# Patient Record
Sex: FEMALE | Race: WHITE | Employment: UNEMPLOYED | URBAN - METROPOLITAN AREA
[De-identification: names, ages, dates, MRNs, and addresses within clinical notes are randomized per-mention and may not be internally consistent; named-entity substitution may affect disease eponyms.]

---

## 2017-08-12 ENCOUNTER — ED HISTORICAL/CONVERTED ENCOUNTER (OUTPATIENT)
Dept: OTHER | Age: 27
End: 2017-08-12

## 2018-08-08 ENCOUNTER — OFFICE VISIT (OUTPATIENT)
Dept: ENDOCRINOLOGY | Age: 28
End: 2018-08-08

## 2018-08-08 VITALS
HEIGHT: 61 IN | TEMPERATURE: 97.6 F | WEIGHT: 146.9 LBS | RESPIRATION RATE: 14 BRPM | OXYGEN SATURATION: 99 % | SYSTOLIC BLOOD PRESSURE: 103 MMHG | BODY MASS INDEX: 27.73 KG/M2 | DIASTOLIC BLOOD PRESSURE: 85 MMHG | HEART RATE: 74 BPM

## 2018-08-08 DIAGNOSIS — E04.9 GOITER: Primary | ICD-10-CM

## 2018-08-08 DIAGNOSIS — E04.9 NODULAR GOITER: ICD-10-CM

## 2018-08-08 RX ORDER — NORETHINDRONE ACETATE AND ETHINYL ESTRADIOL AND FERROUS FUMARATE 1MG-20(24)
KIT ORAL
COMMUNITY
End: 2022-10-29

## 2018-08-08 NOTE — MR AVS SNAPSHOT
[address] [address] 
[phone] Patient: Josy Lane MRN: [id number] Visit Information Date & Time Provider Department Dept. Phone Encounter #  
 8/8/2018 10:00 AM Jackson Fish MD [facility] Diabetes & Endocrinology [phone] [id number] Follow-up Instructions Return if symptoms worsen or fail to improve. Upcoming Health Maintenance Date Due DTaP/Tdap/Td series (1 - Tdap) 7/17/2009 PAP AKA CERVICAL CYTOLOGY 7/17/2009 Influenza Age 5 to Adult 8/1/2018 Allergies as of 8/8/2018  Review Complete On: 8/8/2018 By: Jackson Fish MD  
  
 Severity Noted Reaction Type Reactions Peanut High 08/08/2018    Anaphylaxis Codeine  08/08/2018    Rash Penicillins  08/08/2018    Rash Current Immunizations  Never Reviewed No immunizations on file. Not reviewed this visit You Were Diagnosed With   
  
 Codes Comments Goiter    -  Primary ICD-10-CM: E04.9 ICD-9-CM: 240.9 Vitals BP Pulse Temp Resp Height(growth percentile) Weight(growth percentile) 103/85 (BP 1 Location: Right arm, BP Patient Position: Sitting) 74 97.6 °F (36.4 °C) (Oral) 14 5' 1\" (1.549 m) 146 lb 14.4 oz (66.6 kg) SpO2 BMI OB Status Smoking Status 99% 27.76 kg/m2 Having regular periods Never Smoker BMI and BSA Data Body Mass Index Body Surface Area  
 27.76 kg/m 2 1.69 m 2 Preferred Pharmacy Pharmacy Name Phone [facility] [address], [address] [facility] Cyndi Has AT [address] [phone] Your Updated Medication List  
  
   
This list is accurate as of 8/8/18 10:45 AM.  Always use your most recent med list.  
  
  
  
  
 MINASTRIN 24 FE 1 mg-20 mcg(24) /75 mg (4) Chew Generic drug:  norethindrone-e.estradiol-iron Minastrin 24 Fe 1 mg-20 mcg (24)/75 mg (4) chewable tablet We Performed the Following   
 T3 TOTAL [93482 CPT(R)] T4, FREE X081249 CPT(R)] THYROID ANTIBODY PANEL [43226 CPT(R)] TSH 3RD GENERATION [98178 CPT(R)] Follow-up Instructions Return if symptoms worsen or fail to improve. Introducing [facility] SERVICES! Gely Gr introduces [employer] patient portal. Now you can access parts of your medical record, email your doctor's office, and request medication refills online. 1. In your internet browser, go to [email or web address] 2. Click on the First Time User? Click Here link in the Sign In box. You will see the New Member Sign Up page. 3. Enter your [employer] Access Code exactly as it appears below. You will not need to use this code after youve completed the sign-up process. If you do not sign up before the expiration date, you must request a new code. · [employer] Access Code: [email or web address] Expires: 11/6/2018 10:45 AM 
 
4. Enter the last four digits of your Social Security Number (xxxx) and Date of Birth (mm/dd/yyyy) as indicated and click Submit. You will be taken to the next sign-up page. 5. Create a [employer] ID. This will be your [employer] login ID and cannot be changed, so think of one that is secure and easy to remember. 6. Create a [employer] password. You can change your password at any time. 7. Enter your Password Reset Question and Answer. This can be used at a later time if you forget your password. 8. Enter your e-mail address. You will receive e-mail notification when new information is available in [address] 9. Click Sign Up. You can now view and download portions of your medical record. 10. Click the Download Summary menu link to download a portable copy of your medical information. If you have questions, please visit the Frequently Asked Questions section of the [employer] website. Remember, [employer] is NOT to be used for urgent needs. For medical emergencies, dial 911. Now available from your iPhone and Android! Please provide this summary of care documentation to your next provider. Your primary care clinician is listed as Frank Carter. If you have any questions after today's visit, please call [phone].

## 2018-08-08 NOTE — PROGRESS NOTES
[facility]               Vy Bradford MD        [address] 78 [phone] Fax [phone]           Patient Information  Date:2018  Name : Gely Bazzi 25 y.o.     YOB: 1990         Referred by: Sebastian Guzman MD         Chief Complaint   Patient presents with   [facility] New Patient     referred by Dr. Marcus Cade for Thyroid       History of present illness    Gely Bazzi is a 25 y.o. female  here for evaluation of thyroid. She was found to have goiter 2 years ago, thyroid ultrasound showed a small nodule. She is 2 months postpartum, complains of fatigue, constipation, nervousness, shakiness. Also noticed some increase in the size of the neck, hoarseness. No persistent dysphagia  No recent thyroid function test.  No fever or sore throat. She is not lactating now      No FH of thyroid cancer     Wt Readings from Last 3 Encounters:   18 146 lb 14.4 oz (66.6 kg)       History reviewed. No pertinent past medical history. Current Outpatient Prescriptions   Medication Sig    norethindrone-e.estradiol-iron (MINASTRIN 24 FE) 1 mg-20 mcg(24) /75 mg (4) chew Minastrin 24 Fe 1 mg-20 mcg (24)/75 mg (4) chewable tablet     No current facility-administered medications for this visit. Review of Systems:  - Constitutional Symptoms: no fevers, chills, weight loss  - Eyes: no blurry vision or double vision  - Cardiovascular: no chest pain + palpitations  - Respiratory: no cough or shortness of breath  - Gastrointestinal: no dysphagia or abdominal pain  - Musculoskeletal: no joint pains + weakness  - Integumentary: no rashes  - Neurological: no numbness, tingling, + headaches  - Psychiatric: no depression or anxiety  - Endocrine: no heat or cold intolerance, no polyuria or polydipsia    Physical Examination:  Blood pressure 103/85, pulse 74, temperature 97.6 °F (36.4 °C), temperature source Oral, resp.  rate 14, height 5' 1\" (1.549 m), weight 146 lb 14.4 oz (66.6 kg), SpO2 99 %. Body mass index is 27.76 kg/(m^2). - General: pleasant, no distress, good eye contact  - HEENT: no exopthalmos, no periorbital edema, no scleral/conjunctival injection, EOMI, no lid lag or stare  - Neck: supple, no thyromegaly, no nodules, lymph nodes,   - Cardiovascular: regular,  normal S1 and S2, no murmurs  - Respiratory: clear to auscultation bilaterally  - Gastrointestinal: soft, nontender, nondistended, BS +  - Musculoskeletal: no proximal muscle weakness in upper or lower extremities  - Integumentary: no  tremors, no edema  - Neurological: alert and oriented   - Psychiatric: normal mood and affect  - Skin - normal turgor    Data Reviewed:         [] Reviewed lab  s    Assessment/Plan:     1. Goiter    2. Nodular goiter    3. Postpartum thyroiditis        Nodular goiter: 2 years ago had ultrasound. Ultrasound today showed 1.4 cm left thyroid nodule with no high risk characteristics. No microcalcifications. Obtain thyroid function test  She has both hypo-as well as hyperthyroid symptoms, discussed about postpartum changes in the thyroid function, overlap of the symptoms. Discussed the natural course of thyroid nodules. Follow-up ultrasound in a year discussed        Follow-up Disposition:  Return if symptoms worsen or fail to improve. Thank you for allowing me to participate in the care of this patient. Carlo Paredes MD      Patient Veda Weaver verbalized understanding  Voice-recognition software was used to generate this report, which may result in some phonetic-based errors in the grammar and contents. Even though attempts were made to correct all the mistakes, some may have been missed and remained in the body of the report.

## 2018-08-08 NOTE — PROGRESS NOTES
Nicci Sharma is a 25 y.o. female here for   Chief Complaint   Patient presents with    New Patient     referred by Dr. Imelda Stewart for Thyroid       1. Have you been to the ER, urgent care clinic since your last visit? Hospitalized since your last visit? -n/a    2. Have you seen or consulted any other health care providers outside of the [address] [place] [address] since your last visit?   Include any pap smears or colon screening.-n/a

## 2018-08-09 LAB
T3 SERPL-MCNC: 121 NG/DL (ref 71–180)
T4 FREE SERPL-MCNC: 1.22 NG/DL (ref 0.82–1.77)
THYROGLOB AB SERPL-ACNC: <1 IU/ML (ref 0–0.9)
THYROPEROXIDASE AB SERPL-ACNC: 8 IU/ML (ref 0–34)
TSH SERPL DL<=0.005 MIU/L-ACNC: 1.2 UIU/ML (ref 0.45–4.5)

## 2018-08-09 NOTE — PROGRESS NOTES
Thyroid Ultrasound Report    Patient Information  Date:8/8/2018  Name : Paddy Simmons 25 y.o.     YOB: 1990         Referred by: Kirby Trejo MD , MD    Indication: Thyroid nodule/    Multiple real time longitudinal and transverse images were obtained using a high  resolution ultrasound with a Linear transducer. Right thyroid lobe measures 3.8 x 1.5 x 0.8 cm. Left thyroid lobe measures 4.3 x 1.5 x 1.2 cm. There is a isoechoic nodule in the left lobe measuring 1.4 x 0.7 x 0.6 cm with peripheral vascularization. No microcalcifications. The isthmus measures 0.25 cm. Impression. Left thyroid nodule measuring 1.4 cm with no high risk characteristics. Follow-up ultrasound recommended in a year.      Carolann Mc MD

## 2018-08-11 PROBLEM — E04.9 NODULAR GOITER: Status: ACTIVE | Noted: 2018-08-11

## 2018-08-11 NOTE — PROGRESS NOTES
Thyroid tests are  normal, post pregnancy thyroid levels fluctuate, most of the symptoms will improve with time.   There is no improvement, have her come back for the repeat thyroid function test.  No need for appointment

## 2018-08-13 ENCOUNTER — TELEPHONE (OUTPATIENT)
Dept: ENDOCRINOLOGY | Age: 28
End: 2018-08-13

## 2018-08-13 NOTE — TELEPHONE ENCOUNTER
Per Dr. Jesús Mendoza, informed pt of result note, as noted above. Pt verbalized understanding with no further questions or concerns at this time.

## 2018-08-13 NOTE — TELEPHONE ENCOUNTER
----- Message from Pearline Severs, MD sent at 8/11/2018  7:16 PM EDT -----  Thyroid tests are  normal, post pregnancy thyroid levels fluctuate, most of the symptoms will improve with time.   There is no improvement, have her come back for the repeat thyroid function test.  No need for appointment

## 2018-11-16 ENCOUNTER — ED HISTORICAL/CONVERTED ENCOUNTER (OUTPATIENT)
Dept: OTHER | Age: 28
End: 2018-11-16

## 2019-01-30 ENCOUNTER — PATIENT MESSAGE (OUTPATIENT)
Dept: ENDOCRINOLOGY | Age: 29
End: 2019-01-30

## 2019-11-01 ENCOUNTER — OFFICE VISIT (OUTPATIENT)
Dept: ENDOCRINOLOGY | Age: 29
End: 2019-11-01

## 2019-11-01 VITALS
HEIGHT: 61 IN | RESPIRATION RATE: 14 BRPM | HEART RATE: 72 BPM | OXYGEN SATURATION: 99 % | TEMPERATURE: 97.9 F | DIASTOLIC BLOOD PRESSURE: 69 MMHG | WEIGHT: 137.4 LBS | BODY MASS INDEX: 25.94 KG/M2 | SYSTOLIC BLOOD PRESSURE: 99 MMHG

## 2019-11-01 DIAGNOSIS — E06.9 THYROIDITIS: ICD-10-CM

## 2019-11-01 DIAGNOSIS — E04.9 NODULAR GOITER: Primary | ICD-10-CM

## 2019-11-01 RX ORDER — BISMUTH SUBSALICYLATE 262 MG
1 TABLET,CHEWABLE ORAL DAILY
COMMUNITY
End: 2021-01-28

## 2019-11-01 NOTE — LETTER
11/5/19 Patient: Dionna Elizabeth YOB: 1990 Date of Visit: 11/1/2019 Leslie Smith MD 
[address] [place] [address] VIA Facsimile: [phone] Dear Leslie Smith MD, Thank you for referring Ms. Joy Pineda to [address] [place] [address] for evaluation. My notes for this consultation are attached. If you have questions, please do not hesitate to call me. I look forward to following your patient along with you. Sincerely, Daryl Gonzalez MD

## 2019-11-01 NOTE — PROGRESS NOTES
Petey North MD        [address] 78 [phone] Fax [phone]           Patient Information  Date:11/3/2019  Name : Francia Mays 30 y.o.     YOB: 1990         Referred by: Morena Church MD         Chief Complaint   Patient presents with    Thyroid Problem       History of present illness    Francia Mays is a 30 y.o. female  here for follow-up of thyroid. Nodular goiter, reports pain in the neck, difficulty swallowing,  No GERD, no postnasal drainage  No recent infection  No persistent dysphagia  No fever        No FH of thyroid cancer     Wt Readings from Last 3 Encounters:   11/01/19 137 lb 6.4 oz (62.3 kg)   08/08/18 146 lb 14.4 oz (66.6 kg)       History reviewed. No pertinent past medical history. Current Outpatient Medications   Medication Sig    multivitamin (ONE A DAY) tablet Take 1 Tab by mouth daily.  norethindrone-e.estradiol-iron (MINASTRIN 24 FE) 1 mg-20 mcg(24) /75 mg (4) chew Minastrin 24 Fe 1 mg-20 mcg (24)/75 mg (4) chewable tablet     No current facility-administered medications for this visit. Review of Systems:  - Constitutional Symptoms: no fevers, chills, weight loss  - Eyes: no blurry vision or double vision  - Cardiovascular: no chest pain + palpitations  - Respiratory: no cough or shortness of breath  - Gastrointestinal: no dysphagia or abdominal pain  - Musculoskeletal: no joint pains + weakness  - Integumentary: no rashes  - Neurological: no numbness, tingling, + headaches  - Psychiatric: no depression or anxiety  - Endocrine: no heat or cold intolerance, no polyuria or polydipsia    Physical Examination:  Blood pressure 99/69, pulse 72, temperature 97.9 °F (36.6 °C), temperature source Oral, resp. rate 14, height 5' 1\" (1.549 m), weight 137 lb 6.4 oz (62.3 kg), SpO2 99 %. Body mass index is 25.96 kg/m².   - General: pleasant, no distress, good eye contact  - HEENT: no exopthalmos, no periorbital edema, no scleral/conjunctival injection, EOMI, no lid lag or stare  - Neck: supple, tenderness of the anterior neck area, neck muscles, as well as thyroid, no lymphadenopathy  - Cardiovascular: regular,  normal S1 and S2, no murmurs  - Respiratory: clear to auscultation bilaterally  - Gastrointestinal: soft, nontender, nondistended, BS +  - Musculoskeletal: no proximal muscle weakness in upper or lower extremities  - Integumentary: no  tremors, no edema  - Neurological: alert and oriented   - Psychiatric: normal mood and affect  - Skin - normal turgor    Data Reviewed:         [] Reviewed lab  s    Assessment/Plan:     1. Nodular goiter    2. Thyroiditis        Nodular goiter:   Ultrasound showed 1.4 cm left thyroid nodule with no high risk characteristics. No microcalcifications. The size is small to cause dysphagia  She has tenderness in the entire anterior cervical area not necessarily just overlying thyroid area, thyroiditis versus GERD with esophagitis  Acid reducer, prednisone for 5 days  Ultrasound thyroid November 2019 1.3 x 0.7 x 0.6 cm left thyroid nodule, no change, no increased vascularity    If she has persistent symptoms refer to ENT versus GI for further evaluation dysphagia/odynophagia      Follow-up and Dispositions    · Return in about 1 year (around 11/1/2020). Thank you for allowing me to participate in the care of this patient. Josefina Godwin MD      Patient [facility] verbalized understanding  Voice-recognition software was used to generate this report, which may result in some phonetic-based errors in the grammar and contents. Even though attempts were made to correct all the mistakes, some may have been missed and remained in the body of the report.

## 2019-11-01 NOTE — PROGRESS NOTES
Elsy Angela is a 30 y.o. female here for   Chief Complaint   Patient presents with    Thyroid Problem       1. Have you been to the ER, urgent care clinic since your last visit? Hospitalized since your last visit? -no    2. Have you seen or consulted any other health care providers outside of the [address] [place] [address] since your last visit?   Include any pap smears or colon screening.-not recently  Multivitamin

## 2019-11-02 LAB
ERYTHROCYTE [SEDIMENTATION RATE] IN BLOOD BY WESTERGREN METHOD: 3 MM/HR (ref 0–32)
T4 FREE SERPL-MCNC: 1.27 NG/DL (ref 0.82–1.77)
TSH SERPL DL<=0.005 MIU/L-ACNC: 0.78 UIU/ML (ref 0.45–4.5)

## 2019-11-03 RX ORDER — PREDNISONE 20 MG/1
20 TABLET ORAL
Qty: 7 TAB | Refills: 0 | Status: SHIPPED | OUTPATIENT
Start: 2019-11-03 | End: 2020-07-08

## 2019-11-05 NOTE — PROGRESS NOTES
Thyroid Ultrasound Report    Patient Information  Date:11/5/2019  Name : Fouzia Ren 30 y.o.     YOB: 1990         Referred by: Sanjay Toledo MD , MD    Indication: Thyroid nodule/    Multiple real time longitudinal and transverse images were obtained using a high  resolution ultrasound with a Linear transducer. Right thyroid lobe measures 3.6 x 1.6 x 1.1 cm. Left thyroid lobe measures 3.8 x 1.5 x 1.3 cm. Isthmus measures 0.18 cm  There is a isoechoic nodule in the left lobe measuring 1.3 x 0.7 x 0.6 cm with peripheral vascularization. No microcalcifications. This is another subcentimeter nodule measuring 0.7 cm in the left lobe. Impression. Left thyroid nodule measuring 1. 3 cm which is stable in size compared to prior ultrasounds.      Martha Hatch MD

## 2019-11-14 ENCOUNTER — ED HISTORICAL/CONVERTED ENCOUNTER (OUTPATIENT)
Dept: OTHER | Age: 29
End: 2019-11-14

## 2020-07-08 ENCOUNTER — OFFICE VISIT (OUTPATIENT)
Dept: ENDOCRINOLOGY | Age: 30
End: 2020-07-08

## 2020-07-08 VITALS
OXYGEN SATURATION: 100 % | RESPIRATION RATE: 18 BRPM | HEIGHT: 61 IN | HEART RATE: 80 BPM | SYSTOLIC BLOOD PRESSURE: 103 MMHG | DIASTOLIC BLOOD PRESSURE: 69 MMHG | BODY MASS INDEX: 27.55 KG/M2 | TEMPERATURE: 98.8 F | WEIGHT: 145.9 LBS

## 2020-07-08 DIAGNOSIS — E04.9 NODULAR GOITER: Primary | ICD-10-CM

## 2020-07-08 DIAGNOSIS — M25.50 ARTHRALGIA, UNSPECIFIED JOINT: ICD-10-CM

## 2020-07-08 DIAGNOSIS — E04.9 NODULAR GOITER: ICD-10-CM

## 2020-07-08 DIAGNOSIS — E55.9 VITAMIN D DEFICIENCY: ICD-10-CM

## 2020-07-08 DIAGNOSIS — E06.9 THYROIDITIS: ICD-10-CM

## 2020-07-08 NOTE — PATIENT INSTRUCTIONS
Gluten-Free Diet: Care Instructions Your Care Instructions To help your symptoms, your doctor has recommended a gluten-free diet. This means not eating foods that have gluten in them. Gluten is a kind of protein. It's found in wheat, barley, and rye. If you eat a gluten-free diet, you can help manage your symptoms and prevent long-term problems. You can also get all the nutrition you need. Follow-up care is a key part of your treatment and safety. Be sure to make and go to all appointments, and call your doctor if you are having problems. It's also a good idea to know your test results and keep a list of the medicines you take. How can you care for yourself at home? · Don't eat any foods that have gluten in them. These include bagels, bread, crackers, and some cereals. They also include pasta and pizza. · Carefully read food labels. Look for wheat or wheat products in ice cream and candy. You may also find them in salad dressing, canned and frozen soups and vegetables, and other processed foods. · Avoid all beer products unless the label says they are gluten-free. Beers with and without alcohol have gluten unless the labels say they are gluten-free. This includes lagers, ales, and stouts. · Avoid oats, at least at first. Oats may cause symptoms in some people, perhaps as a result of contamination with wheat, barley, or rye during processing. But many people who have celiac disease can eat moderate amounts of oats without having symptoms. Health professionals vary in their long-term recommendations regarding eating foods with oats. But most agree it is safe to eat oats labeled as gluten-free. · When you eat out, look for restaurants that serve gluten-free food. You can also ask if the  is familiar with gluten-free cooking. · Try to learn more about gluten-free options. Find grocery stores that sell gluten-free pizza and other foods.  If you have access to the Internet, look online for gluten-free foods and recipes. · On a gluten-free eating plan, it's okay to have: 
? Eggs and dairy products. (But some dairy products may make your symptoms worse. Ask your doctor if you have questions about dairy products. Read ingredient labels carefully. Some processed cheeses contain gluten.) ? Flours and foods made with amaranth, arrowroot, beans, buckwheat, corn, cornmeal, flax, millet, potatoes, gluten-free nut and oat bran, quinoa, rice, sorghum, soybeans, tapioca, or teff. ? Fresh, frozen, or canned unprocessed meats. But avoid processed meats. Some examples of processed meats to avoid are hot dogs, salami, and deli meat. Read labels for additives that may contain gluten. ? Fresh, frozen, dried, or canned fruits and vegetables, if they do not have thickeners or other additives that contain gluten. ? Some alcohol drinks. These include wine, liqueurs, and ciders. They also include liquor like whiskey and nolvia. When should you call for help? Watch closely for changes in your health, and be sure to contact your doctor if: 
· You have unexplained weight loss. · You have diarrhea that lasts longer than 1 to 2 weeks. · You have unusual fatigue or mood changes, especially if these last more than a week and are not related to any other illness, such as the flu. · Your symptoms come back again. · Your stomach pain gets worse. Where can you learn more? Go to http://www.gray.com/ Enter 31 41 19 in the search box to learn more about \"Gluten-Free Diet: Care Instructions. \" Current as of: August 22, 2019               Content Version: 12.5 © 6245-8797 [employer]. Care instructions adapted under license by [employer] (which disclaims liability or warranty for this information).  If you have questions about a medical condition or this instruction, always ask your healthcare professional. [address] any warranty or liability for your use of this information.

## 2020-07-08 NOTE — LETTER
7/8/20 Patient: Kim Izaguirre YOB: 1990 Date of Visit: 7/8/2020 Tito Selby MD 
[address] [place] [address] VIA Facsimile: [phone] Dear Tito Selby MD, Thank you for referring Ms. Joy Pineda to [address] [place] [address] for evaluation. My notes for this consultation are attached. If you have questions, please do not hesitate to call me. I look forward to following your patient along with you. Sincerely, Pinky Andres MD

## 2020-07-08 NOTE — PROGRESS NOTES
1. Have you been to the ER, urgent care clinic since your last visit? No  Hospitalized since your last visit? No    2. Have you seen or consulted any other health care providers outside of the [address] [place] [address] since your last visit? Include any pap smears or colon screening.  No

## 2020-07-08 NOTE — PROGRESS NOTES
Petey North MD              Patient Information  Date:7/8/2020  Name : Francia Mays 30 y.o.     YOB: 1990         Referred by: Morena Church MD         Chief Complaint   Patient presents with    Thyroid Problem       History of present illness    Francia Mays is a 30 y.o. female  here for follow-up of thyroid. Nodular goiter, reports pain in the neck, difficulty swallowing, \"feels like a lump in the throat\" which started 2 months ago  She had similar symptoms 6 to 7 months ago, improved with prednisone, Nexium to some extent  Symptoms did not completely fully resolved. Last 2 months noted worsening  No GERD, no postnasal drainage  No recent infection  No fever    Has strong family history of autoimmune disease  Mother has? Lupus, daughter has PANDAS      No FH of thyroid cancer     Wt Readings from Last 3 Encounters:   07/08/20 145 lb 14.4 oz (66.2 kg)   11/01/19 137 lb 6.4 oz (62.3 kg)   08/08/18 146 lb 14.4 oz (66.6 kg)       History reviewed. No pertinent past medical history. Current Outpatient Medications   Medication Sig    multivitamin (ONE A DAY) tablet Take 1 Tab by mouth daily.  norethindrone-e.estradiol-iron (MINASTRIN 24 FE) 1 mg-20 mcg(24) /75 mg (4) chew Minastrin 24 Fe 1 mg-20 mcg (24)/75 mg (4) chewable tablet     No current facility-administered medications for this visit. Review of Systems:  -   - Gastrointestinal: no dysphagia or abdominal pain  - Musculoskeletal: no joint pains + weakness  - Integumentary: no rashes  - Neurological: no numbness, tingling, + headaches  - Psychiatric: no depression or anxiety  - Endocrine: no heat or cold intolerance, no polyuria or polydipsia    Physical Examination:  Blood pressure 103/69, pulse 80, temperature 98.8 °F (37.1 °C), temperature source Oral, resp. rate 18, height 5' 1\" (1.549 m), weight 145 lb 14.4 oz (66.2 kg), SpO2 100 %.     Body mass index is 27.57 kg/m².  - General: pleasant, no distress, good eye contact  - HEENT: no exopthalmos, no periorbital edema, no scleral/conjunctival injection, EOMI, no lid lag or stare  - Neck: Tenderness of the anterior neck area, neck muscles, cricoid area as well as thyroid, no lymphadenopathy  - Cardiovascular: regular,  normal S1 and S2,  - Respiratory: clear to auscultation bilaterally  - Gastrointestinal: soft, nontender, nondistended, BS +  - Musculoskeletal: no proximal muscle weakness in upper or lower extremities  - Integumentary: no  tremors, no edema  - Neurological: alert and oriented   - Psychiatric: normal mood and affect  - Skin - normal turgor    Data Reviewed:         [] Reviewed lab  s    Assessment/Plan:     1. Nodular goiter    2. Thyroiditis    3. Arthralgia, unspecified joint    4. Vitamin D deficiency        Nodular goiter:   Ultrasound showed 1.4 cm left thyroid nodule with no high risk characteristics. No microcalcifications in 2019. July 2020 1.5 cm left thyroid nodule, irregular borders, no increased vascularity  The size is small to cause dysphagia  She has tenderness in the entire anterior cervical area not necessarily just overlying thyroid area, thyroiditis versus GERD with esophagitis  NSAIDs for 7 days, Prilosec for 2 weeks  She had biopsy of the nodule several years ago, states the nodule was less than 1 cm, I do not have the report  In 2019, TPO normal, ESR normal, thyroid function tests were normal    Check thyroglobulin, TSH, free T4.,  ESR    If no improvement refer to ENT versus GI for further evaluation dysphagia/odynophagia    Left thyroid nodule: Meets the criteria for FNA, will wait for thyroiditis to resolve      Follow-up and Dispositions    · Return in about 2 months (around 9/8/2020). Thank you for allowing me to participate in the care of this patient.     Mike Harris MD      Patient Madelyn Combs verbalized understanding  Voice-recognition software was used to generate this report, which may result in some phonetic-based errors in the grammar and contents. Even though attempts were made to correct all the mistakes, some may have been missed and remained in the body of the report.

## 2020-07-09 ENCOUNTER — TELEPHONE (OUTPATIENT)
Dept: ENDOCRINOLOGY | Age: 30
End: 2020-07-09

## 2020-07-09 LAB
ANA SER QL: NEGATIVE
ERYTHROCYTE [SEDIMENTATION RATE] IN BLOOD BY WESTERGREN METHOD: 3 MM/HR (ref 0–32)
T4 FREE SERPL-MCNC: 1.19 NG/DL (ref 0.82–1.77)
THYROGLOB AB SERPL-ACNC: <1 IU/ML (ref 0–0.9)
THYROPEROXIDASE AB SERPL-ACNC: 7 IU/ML (ref 0–34)
TSH SERPL DL<=0.005 MIU/L-ACNC: 1.07 UIU/ML (ref 0.45–4.5)

## 2020-07-09 NOTE — TELEPHONE ENCOUNTER
Per Dr. Cher Zavala, informed pt of result note, as noted above. Pt verbalized understanding and stated someone told her she would be contacted with the appt for ENT. Informed her the  is working on it. No further questions or concerns at this time. [facility]

## 2020-07-09 NOTE — PROGRESS NOTES
Thyroid is working normal.  According to the test there is no evidence of autoimmune condition. Continue the treatment as discussed, if no improvement need to see the ENT.       I ordered thyroglobulin, lab did thyroglobulin antibody, please call lab and see if they can add thyroglobulin

## 2020-07-09 NOTE — PROGRESS NOTES
Thyroid Ultrasound Report    Patient Information  Date:7/8/2020  Name : Garry Brewer y.o.     YOB: 1990         Referred by: Felciitas Dickerson MD , MD    Indication: Thyroid nodule/    Multiple real time longitudinal and transverse images were obtained using a high  resolution ultrasound with a Linear transducer. Right thyroid lobe measures 3.6 x 1.6 x 1.1 cm. Left thyroid lobe measures 3.8 x 1.5 x 1.3 cm. Isthmus measures 0.18 cm  There is a isoechoic nodule in the left lobe measuring 1. 5 x 0.7 x 0.6 cm with peripheral vascularization. No microcalcifications. Borders are irregular. Impression. Left thyroid nodule measuring 1.  5 cm which is stable in size compared to prior ultrasounds.   Meets the criteria for fine-needle aspiration biopsy     Tito Fung MD

## 2020-07-09 NOTE — TELEPHONE ENCOUNTER
----- Message from Aida Cm MD sent at 7/9/2020  1:39 PM EDT -----  Thyroid is working normal.  According to the test there is no evidence of autoimmune condition. Continue the treatment as discussed, if no improvement need to see the ENT.       I ordered thyroglobulin, lab did thyroglobulin antibody, please call lab and see if they can add thyroglobulin

## 2020-07-13 LAB
SPECIMEN STATUS REPORT, ROLRST: NORMAL
THYROGLOB AB SERPL-ACNC: <1 IU/ML
THYROGLOB SERPL-MCNC: 9 NG/ML
THYROGLOB SERPL-MCNC: NORMAL NG/ML

## 2020-07-24 RX ORDER — PREDNISONE 20 MG/1
20 TABLET ORAL
Qty: 7 TAB | Refills: 0 | Status: SHIPPED | OUTPATIENT
Start: 2020-07-24 | End: 2021-01-28

## 2020-08-06 ENCOUNTER — OFFICE VISIT (OUTPATIENT)
Dept: ENT CLINIC | Age: 30
End: 2020-08-06
Payer: COMMERCIAL

## 2020-08-06 VITALS
SYSTOLIC BLOOD PRESSURE: 120 MMHG | WEIGHT: 147 LBS | HEIGHT: 62 IN | OXYGEN SATURATION: 99 % | DIASTOLIC BLOOD PRESSURE: 70 MMHG | RESPIRATION RATE: 18 BRPM | HEART RATE: 79 BPM | TEMPERATURE: 97.5 F | BODY MASS INDEX: 27.05 KG/M2

## 2020-08-06 DIAGNOSIS — J39.2 PHARYNGEAL OR NASOPHARYNGEAL CYST: ICD-10-CM

## 2020-08-06 DIAGNOSIS — E04.1 THYROID NODULE: ICD-10-CM

## 2020-08-06 DIAGNOSIS — R13.12 OROPHARYNGEAL DYSPHAGIA: ICD-10-CM

## 2020-08-06 DIAGNOSIS — J02.9 ACUTE PHARYNGITIS, UNSPECIFIED ETIOLOGY: Primary | ICD-10-CM

## 2020-08-06 PROCEDURE — 31575 DIAGNOSTIC LARYNGOSCOPY: CPT | Performed by: OTOLARYNGOLOGY

## 2020-08-06 PROCEDURE — 99204 OFFICE O/P NEW MOD 45 MIN: CPT | Performed by: OTOLARYNGOLOGY

## 2020-08-06 RX ORDER — LEVOFLOXACIN 500 MG/1
500 TABLET, FILM COATED ORAL DAILY
Qty: 14 TAB | Refills: 1 | Status: SHIPPED | OUTPATIENT
Start: 2020-08-06 | End: 2021-01-28

## 2020-08-06 NOTE — PROGRESS NOTES
Subjective:    Eleanor Hernandez   26 y.o.   1990     HPI     Location - throat    Quality - throat pain, dysphagia, globus    Severity -  Mild,moderate    Duration - approx 7-8 mos    Timing - constant    Context - pt with known left thyroid nodule, had FNA approx 3 years ago, she does f/u with  for this; her symptoms are more sore throat with globus and dysphagia, she had had T&A as a child; no recent FNA although one may be planned; she tried steroids and PPI    Modifying Features - steroids did help a little, reduced swelling so she could swallow again    Associated symptoms/signs - as above      Review of Systems  Review of Systems   Constitutional: Negative for chills and fever. HENT: Positive for sore throat. Negative for ear pain, hearing loss, nosebleeds and tinnitus. Eyes: Negative for blurred vision and double vision. Respiratory: Negative for cough, sputum production and shortness of breath. Cardiovascular: Negative for chest pain and palpitations. Gastrointestinal: Negative for heartburn, nausea and vomiting. Musculoskeletal: Negative for joint pain and neck pain. Skin: Negative. Neurological: Positive for speech change. Negative for dizziness, weakness and headaches. Endo/Heme/Allergies: Negative for environmental allergies. Does not bruise/bleed easily. Psychiatric/Behavioral: Negative for memory loss. The patient does not have insomnia. History reviewed. No pertinent past medical history.   Past Surgical History:   Procedure Laterality Date    HX APPENDECTOMY      HX CHOLECYSTECTOMY      HX TONSIL AND ADENOIDECTOMY        Family History   Problem Relation Age of Onset    Hypertension Father     Thyroid Disease Father     Thyroid Disease Other     Hypertension Other      Social History     Tobacco Use    Smoking status: Never Smoker    Smokeless tobacco: Never Used   Substance Use Topics    Alcohol use: No      Prior to Admission medications Medication Sig Start Date End Date Taking? Authorizing Provider   levoFLOXacin (LEVAQUIN) 500 mg tablet Take 1 Tab by mouth daily. 8/6/20  Yes Yomi Juarez MD   predniSONE (DELTASONE) 20 mg tablet Take 20 mg by mouth daily (with breakfast). 7/24/20   Khanh Gold MD   multivitamin (ONE A DAY) tablet Take 1 Tab by mouth daily. Provider, Historical   norethindrone-e.estradiol-iron (MINASTRIN 24 FE) 1 mg-20 mcg(24) /75 mg (4) chew Minastrin 24 Fe 1 mg-20 mcg (24)/75 mg (4) chewable tablet    Provider, Historical        Allergies   Allergen Reactions    Peanut Anaphylaxis    Codeine Rash    Penicillins Rash         Objective:     Visit Vitals  /70 (BP 1 Location: Left arm, BP Patient Position: Sitting)   Pulse 79   Temp 97.5 °F (36.4 °C) (Oral)   Resp 18   Ht 5' 2\" (1.575 m)   Wt 147 lb (66.7 kg)   SpO2 99%   BMI 26.89 kg/m²        Physical Exam  Vitals signs reviewed. Constitutional:       General: She is awake. She is not in acute distress. Appearance: Normal appearance. She is well-groomed. HENT:      Head: Normocephalic and atraumatic. Jaw: There is normal jaw occlusion. No trismus, tenderness or malocclusion. Salivary Glands: Right salivary gland is not diffusely enlarged or tender. Left salivary gland is not diffusely enlarged or tender. Right Ear: Hearing, tympanic membrane, ear canal and external ear normal.      Left Ear: Hearing, tympanic membrane, ear canal and external ear normal.      Ears:      Mariee exam findings: does not lateralize. Right Rinne: AC > BC. Left Rinne: AC > BC. Nose: Septal deviation (right) present. No nasal deformity or mucosal edema. Right Nostril: No epistaxis. Left Nostril: No epistaxis. Right Turbinates: Swollen. Not enlarged or pale. Left Turbinates: Swollen. Not enlarged or pale. Right Sinus: No maxillary sinus tenderness or frontal sinus tenderness.       Left Sinus: No maxillary sinus tenderness or frontal sinus tenderness. Mouth/Throat:      Lips: Pink. No lesions. Mouth: Mucous membranes are moist. No oral lesions. Dentition: Normal dentition. No dental caries. Tongue: No lesions. Palate: No mass and lesions. Pharynx: Uvula midline. Oropharyngeal exudate and posterior oropharyngeal erythema present. Tonsils: No tonsillar exudate. 0 on the right. 0 on the left. Comments: Yellowish PND  Eyes:      General: Vision grossly intact. Extraocular Movements: Extraocular movements intact. Right eye: No nystagmus. Left eye: No nystagmus. Conjunctiva/sclera: Conjunctivae normal.      Pupils: Pupils are equal, round, and reactive to light. Neck:      Musculoskeletal: No edema or erythema. Thyroid: Thyroid tenderness present. No thyroid mass or thyromegaly. Trachea: Phonation normal. Tracheal tenderness present. No tracheal deviation. Cardiovascular:      Rate and Rhythm: Normal rate and regular rhythm. Pulmonary:      Effort: Pulmonary effort is normal. No respiratory distress. Breath sounds: No stridor. Musculoskeletal: Normal range of motion. General: No swelling or tenderness. Lymphadenopathy:      Cervical: No cervical adenopathy. Skin:     General: Skin is warm and dry. Findings: No lesion or rash. Neurological:      General: No focal deficit present. Mental Status: She is alert and oriented to person, place, and time. Mental status is at baseline. Cranial Nerves: Cranial nerves are intact. Coordination: Romberg sign negative. Gait: Gait is intact. Comments: Negative Hallpike   Psychiatric:         Mood and Affect: Mood normal.         Behavior: Behavior normal. Behavior is cooperative. Procedure Note - Fiberoptic Laryngoscopy    The nostril is packed with lidocaine/oxymetazoline cottonball for several minutes for topical anesthesia.   After several minutes the packing is removed and fiberoptic scope is advanced. Findings are as summarized. Nose - edema turbinates, septum to right  Nasopharynx - erythema; midline nasopharyngeal cyst with purulence  Oropharynx - posterior wall erythema  Hypopharynx - normal pyriform sinus and post-cricoid region  Larynx - normal arytenoids, false cords, and true cords  Subglottis - visualized upper trachea is normal      Assessment/Plan:     Encounter Diagnoses   Name Primary?  Acute pharyngitis, unspecified etiology Yes    Oropharyngeal dysphagia     Thyroid nodule     Pharyngeal or nasopharyngeal cyst      Chronic nasopharyngitis with cyst  No other pathology noted on scope  Will do 2 weeks levoflox, no exercise call if any tendon pain  Reviewed endocrine notes, labs. Thyroid nodule is not the cause, but she still may need FNA cont fu with Dr. Kareem Tyler with me in 1 mo      Orders Placed This Encounter    levoFLOXacin (LEVAQUIN) 500 mg tablet     Follow-up and Dispositions    · Return in about 4 weeks (around 9/3/2020).

## 2020-09-03 ENCOUNTER — OFFICE VISIT (OUTPATIENT)
Dept: ENT CLINIC | Age: 30
End: 2020-09-03
Payer: COMMERCIAL

## 2020-09-03 VITALS
OXYGEN SATURATION: 98 % | WEIGHT: 147 LBS | SYSTOLIC BLOOD PRESSURE: 118 MMHG | BODY MASS INDEX: 27.05 KG/M2 | HEIGHT: 62 IN | TEMPERATURE: 98.2 F | DIASTOLIC BLOOD PRESSURE: 78 MMHG | HEART RATE: 71 BPM | RESPIRATION RATE: 18 BRPM

## 2020-09-03 DIAGNOSIS — J30.1 ALLERGIC RHINITIS DUE TO POLLEN, UNSPECIFIED SEASONALITY: ICD-10-CM

## 2020-09-03 DIAGNOSIS — E04.1 THYROID NODULE: ICD-10-CM

## 2020-09-03 DIAGNOSIS — J31.1 CHRONIC NASOPHARYNGITIS: ICD-10-CM

## 2020-09-03 DIAGNOSIS — K21.9 GASTROESOPHAGEAL REFLUX DISEASE WITHOUT ESOPHAGITIS: ICD-10-CM

## 2020-09-03 DIAGNOSIS — J39.2 PHARYNGEAL OR NASOPHARYNGEAL CYST: Primary | ICD-10-CM

## 2020-09-03 PROCEDURE — 31575 DIAGNOSTIC LARYNGOSCOPY: CPT | Performed by: OTOLARYNGOLOGY

## 2020-09-03 PROCEDURE — 99214 OFFICE O/P EST MOD 30 MIN: CPT | Performed by: OTOLARYNGOLOGY

## 2020-09-03 NOTE — LETTER
9/3/20 Patient: Lucinda Bull YOB: 1990 Date of Visit: 9/3/2020 Armand Rock MD 
[address] [address] VIA Facsimile: [phone] Dear Armand Rock MD, Thank you for referring Ms. Joy Pineda to [facility] EAR, NOSE, THROAT AND ALLERGY CARE for evaluation. My notes for this consultation are attached. If you have questions, please do not hesitate to call me. I look forward to following your patient along with you. Sincerely, Todd Oliver MD

## 2020-09-03 NOTE — PROGRESS NOTES
Subjective:    Filiberto Speaks   26 y.o.   1990     Follow-up   Pertinent negatives include no chest pain, no headaches and no shortness of breath. Location - throat    Quality - throat pain, dysphagia, globus    Severity -  Mild,moderate    Duration - approx 7-8 mos    Timing - constant    Context - pt with known left thyroid nodule, had FNA approx 3 years ago, she does f/u with  for this; her symptoms are more sore throat with globus and dysphagia, she had had T&A as a child; no recent FNA although one may be planned; she tried steroids and PPI    Modifying Features - steroids did help a little, reduced swelling so she could swallow again    Associated symptoms/signs - as above    9/3/20 - 1 mo f/u pt states while on levaquin she felt a little better maybe 15%, but now more or less feels same generalized sore throat; occ mucus production too hard to get up, couple times/week; she has been on PPI and allergy meds not helping much      Review of Systems  Review of Systems   Constitutional: Negative for chills and fever. HENT: Positive for sore throat. Negative for ear pain, hearing loss, nosebleeds and tinnitus. Eyes: Negative for blurred vision and double vision. Respiratory: Positive for sputum production. Negative for cough and shortness of breath. Cardiovascular: Negative for chest pain and palpitations. Gastrointestinal: Negative for heartburn, nausea and vomiting. Musculoskeletal: Negative for joint pain and neck pain. Skin: Negative. Neurological: Positive for speech change. Negative for dizziness, weakness and headaches. Endo/Heme/Allergies: Negative for environmental allergies. Does not bruise/bleed easily. Psychiatric/Behavioral: Negative for memory loss. The patient does not have insomnia. History reviewed. No pertinent past medical history.   Past Surgical History:   Procedure Laterality Date    HX APPENDECTOMY      HX CHOLECYSTECTOMY      HX TONSIL AND ADENOIDECTOMY        Family History   Problem Relation Age of Onset    Hypertension Father     Thyroid Disease Father     Thyroid Disease Other     Hypertension Other      Social History     Tobacco Use    Smoking status: Never Smoker    Smokeless tobacco: Never Used   Substance Use Topics    Alcohol use: No      Prior to Admission medications    Medication Sig Start Date End Date Taking? Authorizing Provider   levoFLOXacin (LEVAQUIN) 500 mg tablet Take 1 Tab by mouth daily. 8/6/20   Leonardo Villa MD   predniSONE (DELTASONE) 20 mg tablet Take 20 mg by mouth daily (with breakfast). 7/24/20   Russell Greenberg MD   multivitamin (ONE A DAY) tablet Take 1 Tab by mouth daily. Provider, Historical   norethindrone-e.estradiol-iron (MINASTRIN 24 FE) 1 mg-20 mcg(24) /75 mg (4) chew Minastrin 24 Fe 1 mg-20 mcg (24)/75 mg (4) chewable tablet    Provider, Historical        Allergies   Allergen Reactions    Peanut Anaphylaxis    Codeine Rash    Penicillins Rash         Objective:     Visit Vitals  /78 (BP 1 Location: Left arm, BP Patient Position: Sitting)   Pulse 71   Temp 98.2 °F (36.8 °C) (Oral)   Resp 18   Ht 5' 2\" (1.575 m)   Wt 147 lb (66.7 kg)   SpO2 98%   BMI 26.89 kg/m²        Physical Exam  Vitals signs reviewed. Constitutional:       General: She is awake. She is not in acute distress. Appearance: Normal appearance. She is well-groomed. HENT:      Head: Normocephalic and atraumatic. Jaw: There is normal jaw occlusion. No trismus, tenderness or malocclusion. Salivary Glands: Right salivary gland is not diffusely enlarged or tender. Left salivary gland is not diffusely enlarged or tender. Right Ear: Hearing, tympanic membrane, ear canal and external ear normal.      Left Ear: Hearing, tympanic membrane, ear canal and external ear normal.      Ears:      Mariee exam findings: does not lateralize. Right Rinne: AC > BC. Left Rinne: AC > BC.      Nose: Septal deviation (right) present. No nasal deformity or mucosal edema. Right Nostril: No epistaxis. Left Nostril: No epistaxis. Right Turbinates: Swollen. Not enlarged or pale. Left Turbinates: Swollen. Not enlarged or pale. Right Sinus: No maxillary sinus tenderness or frontal sinus tenderness. Left Sinus: No maxillary sinus tenderness or frontal sinus tenderness. Mouth/Throat:      Lips: Pink. No lesions. Mouth: Mucous membranes are moist. No oral lesions. Dentition: Normal dentition. No dental caries. Tongue: No lesions. Palate: No mass and lesions. Pharynx: Oropharynx is clear. Uvula midline. Tonsils: No tonsillar exudate. 0 on the right. 0 on the left. Eyes:      General: Vision grossly intact. Extraocular Movements: Extraocular movements intact. Right eye: No nystagmus. Left eye: No nystagmus. Conjunctiva/sclera: Conjunctivae normal.      Pupils: Pupils are equal, round, and reactive to light. Neck:      Musculoskeletal: No edema or erythema. Thyroid: Thyroid tenderness present. No thyroid mass or thyromegaly. Trachea: Phonation normal. Tracheal tenderness present. No tracheal deviation. Cardiovascular:      Rate and Rhythm: Normal rate and regular rhythm. Pulmonary:      Effort: Pulmonary effort is normal. No respiratory distress. Breath sounds: No stridor. Musculoskeletal: Normal range of motion. General: No swelling or tenderness. Lymphadenopathy:      Cervical: No cervical adenopathy. Skin:     General: Skin is warm and dry. Findings: No lesion or rash. Neurological:      General: No focal deficit present. Mental Status: She is alert and oriented to person, place, and time. Mental status is at baseline. Cranial Nerves: Cranial nerves are intact. Coordination: Romberg sign negative. Gait: Gait is intact.    Psychiatric:         Mood and Affect: Mood normal.         Behavior: Behavior normal. Behavior is cooperative. Procedure Note - Fiberoptic Laryngoscopy    The nostril is packed with lidocaine/oxymetazoline cottonball for several minutes for topical anesthesia. After several minutes the packing is removed and fiberoptic scope is advanced. Findings are as summarized. Nose - edema turbinates, septum to right  Nasopharynx - erythema; midline nasopharyngeal cyst no purulence today  Oropharynx - posterior wall cobblestoning no further erythema  Hypopharynx - normal pyriform sinus and post-cricoid region  Larynx - normal arytenoids, false cords, and true cords  Subglottis - visualized upper trachea is normal      Assessment/Plan:     Encounter Diagnoses   Name Primary?  Pharyngeal or nasopharyngeal cyst Yes    Thyroid nodule     Chronic nasopharyngitis     Gastroesophageal reflux disease without esophagitis     Allergic rhinitis due to pollen, unspecified seasonality      Chronic nasopharyngitis with cyst  No other pathology noted on scope  Abx did resolve any visible infection, mucus  Would do CT neck to assess for other pathology causing her chronic throat pain. Then would consider OR excision of NP cyst.  Reviewed endocrine notes, labs. Thyroid nodule is not the cause, but she still may need FNA cont fu with Dr. Selby Just        No orders of the defined types were placed in this encounter. Follow-up and Dispositions    · Return for will call after CT scan.

## 2020-09-17 ENCOUNTER — HOSPITAL ENCOUNTER (OUTPATIENT)
Dept: CT IMAGING | Age: 30
Discharge: HOME | End: 2020-09-17
Payer: COMMERCIAL

## 2020-09-17 DIAGNOSIS — J39.2 PHARYNGEAL OR NASOPHARYNGEAL CYST: ICD-10-CM

## 2020-09-17 DIAGNOSIS — E04.1 THYROID NODULE: ICD-10-CM

## 2020-09-17 PROCEDURE — 74011000258 HC RX REV CODE- 258: Performed by: RADIOLOGY

## 2020-09-17 PROCEDURE — 74011000636 HC RX REV CODE- 636: Performed by: RADIOLOGY

## 2020-09-17 PROCEDURE — 70491 CT SOFT TISSUE NECK W/DYE: CPT

## 2020-09-17 RX ORDER — SODIUM CHLORIDE 0.9 % (FLUSH) 0.9 %
10 SYRINGE (ML) INJECTION
Status: COMPLETED | OUTPATIENT
Start: 2020-09-17 | End: 2020-09-17

## 2020-09-17 RX ADMIN — IOPAMIDOL 100 ML: 612 INJECTION, SOLUTION INTRAVENOUS at 11:53

## 2020-09-17 RX ADMIN — Medication 10 ML: at 11:53

## 2020-09-17 RX ADMIN — SODIUM CHLORIDE 100 ML: 900 INJECTION, SOLUTION INTRAVENOUS at 11:53

## 2020-09-21 NOTE — PROGRESS NOTES
I reviewed her CT scan and called her.   No other abnormalilty other than her known thyroid cyst and the nasopharyngeal cyst.  We discussed excision/bx of her nasopharyngeal cyst and she wants to schedule for Oct 1st.    Surgery - nasopharyngoscopy with excision of nasopharyngeal cyst - (sinus surgery setup)  10/1/20 @ [facility]  Outpatient, general anesthesia

## 2020-09-27 ENCOUNTER — HOSPITAL ENCOUNTER (OUTPATIENT)
Dept: PREADMISSION TESTING | Age: 30
Discharge: HOME | End: 2020-09-27

## 2021-01-28 ENCOUNTER — HOSPITAL ENCOUNTER (EMERGENCY)
Age: 31
Discharge: HOME | End: 2021-01-28
Attending: EMERGENCY MEDICINE | Admitting: EMERGENCY MEDICINE
Payer: COMMERCIAL

## 2021-01-28 ENCOUNTER — APPOINTMENT (OUTPATIENT)
Dept: CT IMAGING | Age: 31
End: 2021-01-28
Attending: EMERGENCY MEDICINE
Payer: COMMERCIAL

## 2021-01-28 VITALS
DIASTOLIC BLOOD PRESSURE: 85 MMHG | HEIGHT: 61 IN | BODY MASS INDEX: 27.38 KG/M2 | OXYGEN SATURATION: 99 % | RESPIRATION RATE: 16 BRPM | HEART RATE: 100 BPM | TEMPERATURE: 98.2 F | WEIGHT: 145 LBS | SYSTOLIC BLOOD PRESSURE: 126 MMHG
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
## 2021-01-28 DIAGNOSIS — S16.1XXA STRAIN OF NECK MUSCLE, INITIAL ENCOUNTER: ICD-10-CM

## 2021-01-28 LAB
ALBUMIN SERPL-MCNC: 3.6 G/DL (ref 3.5–5)
ALBUMIN/GLOB SERPL: 1 {RATIO} (ref 1.1–2.2)
ALP SERPL-CCNC: 64 U/L (ref 45–117)
ALT SERPL-CCNC: 15 U/L (ref 12–78)
ANION GAP SERPL CALC-SCNC: 5 MMOL/L (ref 5–15)
AST SERPL W P-5'-P-CCNC: 14 U/L (ref 15–37)
BASOPHILS # BLD: 0 K/UL (ref 0–0.1)
BASOPHILS NFR BLD: 0 % (ref 0–1)
BILIRUB SERPL-MCNC: 0.6 MG/DL (ref 0.2–1)
BUN SERPL-MCNC: 10 MG/DL (ref 6–20)
BUN/CREAT SERPL: 16 (ref 12–20)
CA-I BLD-MCNC: 8.6 MG/DL (ref 8.5–10.1)
CHLORIDE SERPL-SCNC: 106 MMOL/L (ref 97–108)
CO2 SERPL-SCNC: 26 MMOL/L (ref 21–32)
CREAT SERPL-MCNC: 0.61 MG/DL (ref 0.55–1.02)
DIFFERENTIAL METHOD BLD: ABNORMAL
EOSINOPHIL # BLD: 0.1 K/UL (ref 0–0.4)
EOSINOPHIL NFR BLD: 1 % (ref 0–7)
ERYTHROCYTE [DISTWIDTH] IN BLOOD BY AUTOMATED COUNT: 12.1 % (ref 11.5–14.5)
GLOBULIN SER CALC-MCNC: 3.6 G/DL (ref 2–4)
GLUCOSE SERPL-MCNC: 92 MG/DL (ref 65–100)
HCT VFR BLD AUTO: 39.6 % (ref 35–47)
HGB BLD-MCNC: 13.6 G/DL (ref 11.5–16)
IMM GRANULOCYTES # BLD AUTO: 0 K/UL (ref 0–0.04)
IMM GRANULOCYTES NFR BLD AUTO: 0 % (ref 0–0.5)
LYMPHOCYTES # BLD: 0.8 K/UL (ref 0.8–3.5)
LYMPHOCYTES NFR BLD: 14 % (ref 12–49)
MCH RBC QN AUTO: 31.1 PG (ref 26–34)
MCHC RBC AUTO-ENTMCNC: 34.3 G/DL (ref 30–36.5)
MCV RBC AUTO: 90.6 FL (ref 80–99)
MONOCYTES # BLD: 0.4 K/UL (ref 0–1)
MONOCYTES NFR BLD: 7 % (ref 5–13)
NEUTS SEG # BLD: 4.1 K/UL (ref 1.8–8)
NEUTS SEG NFR BLD: 78 % (ref 32–75)
PLATELET # BLD AUTO: 165 K/UL (ref 150–400)
PMV BLD AUTO: 12.1 FL (ref 8.9–12.9)
POTASSIUM SERPL-SCNC: 4.5 MMOL/L (ref 3.5–5.1)
PROT SERPL-MCNC: 7.2 G/DL (ref 6.4–8.2)
RBC # BLD AUTO: 4.37 M/UL (ref 3.8–5.2)
SODIUM SERPL-SCNC: 137 MMOL/L (ref 136–145)
TROPONIN I SERPL-MCNC: <0.05 NG/ML
WBC # BLD AUTO: 5.3 K/UL (ref 3.6–11)

## 2021-01-28 PROCEDURE — 74011000636 HC RX REV CODE- 636: Performed by: EMERGENCY MEDICINE

## 2021-01-28 PROCEDURE — 72125 CT NECK SPINE W/O DYE: CPT

## 2021-01-28 PROCEDURE — 74177 CT ABD & PELVIS W/CONTRAST: CPT

## 2021-01-28 PROCEDURE — 99282 EMERGENCY DEPT VISIT SF MDM: CPT

## 2021-01-28 PROCEDURE — 71275 CT ANGIOGRAPHY CHEST: CPT

## 2021-01-28 PROCEDURE — 80053 COMPREHEN METABOLIC PANEL: CPT

## 2021-01-28 PROCEDURE — 84484 ASSAY OF TROPONIN QUANT: CPT

## 2021-01-28 PROCEDURE — 36415 COLL VENOUS BLD VENIPUNCTURE: CPT

## 2021-01-28 PROCEDURE — 70450 CT HEAD/BRAIN W/O DYE: CPT

## 2021-01-28 PROCEDURE — 85025 COMPLETE CBC W/AUTO DIFF WBC: CPT

## 2021-01-28 RX ORDER — CYCLOBENZAPRINE HCL 10 MG
10 TABLET ORAL
Qty: 12 TAB | Refills: 0 | Status: SHIPPED | OUTPATIENT
Start: 2021-01-28 | End: 2022-10-29

## 2021-01-28 RX ORDER — NAPROXEN 500 MG/1
500 TABLET ORAL 2 TIMES DAILY WITH MEALS
Qty: 20 TAB | Refills: 0 | Status: SHIPPED | OUTPATIENT
Start: 2021-01-28 | End: 2022-10-29

## 2021-01-28 RX ADMIN — IOPAMIDOL 100 ML: 755 INJECTION, SOLUTION INTRAVENOUS at 10:40
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
## 2022-02-11 ENCOUNTER — TRANSCRIBE ORDER (OUTPATIENT)
Dept: SCHEDULING | Age: 32
End: 2022-02-11

## 2022-02-11 DIAGNOSIS — G35 MULTIPLE SCLEROSIS (HCC): ICD-10-CM

## 2022-02-11 DIAGNOSIS — H53.8 OTHER VISUAL DISTURBANCES: Primary | ICD-10-CM

## 2022-03-18 PROBLEM — K21.9 GASTROESOPHAGEAL REFLUX DISEASE WITHOUT ESOPHAGITIS: Status: ACTIVE | Noted: 2020-09-03

## 2022-03-18 PROBLEM — E04.1 THYROID NODULE: Status: ACTIVE | Noted: 2020-08-06

## 2022-03-19 PROBLEM — J31.1 CHRONIC NASOPHARYNGITIS: Status: ACTIVE | Noted: 2020-09-03

## 2022-03-19 PROBLEM — R13.12 OROPHARYNGEAL DYSPHAGIA: Status: ACTIVE | Noted: 2020-08-06

## 2022-03-19 PROBLEM — E55.9 VITAMIN D DEFICIENCY: Status: ACTIVE | Noted: 2020-07-08

## 2022-03-19 PROBLEM — J30.1 ALLERGIC RHINITIS DUE TO POLLEN: Status: ACTIVE | Noted: 2020-09-03

## 2022-03-19 PROBLEM — J39.2 PHARYNGEAL OR NASOPHARYNGEAL CYST: Status: ACTIVE | Noted: 2020-08-06

## 2022-03-19 PROBLEM — J02.9 ACUTE PHARYNGITIS: Status: ACTIVE | Noted: 2020-08-06

## 2022-03-19 PROBLEM — E06.9 THYROIDITIS: Status: ACTIVE | Noted: 2020-07-08

## 2022-03-19 PROBLEM — E04.9 NODULAR GOITER: Status: ACTIVE | Noted: 2018-08-11

## 2022-03-31 ENCOUNTER — OFFICE VISIT (OUTPATIENT)
Dept: ENDOCRINOLOGY | Age: 32
End: 2022-03-31
Payer: COMMERCIAL

## 2022-03-31 VITALS
WEIGHT: 149 LBS | HEIGHT: 61 IN | TEMPERATURE: 97.5 F | OXYGEN SATURATION: 100 % | RESPIRATION RATE: 16 BRPM | SYSTOLIC BLOOD PRESSURE: 100 MMHG | BODY MASS INDEX: 28.13 KG/M2 | HEART RATE: 70 BPM | DIASTOLIC BLOOD PRESSURE: 66 MMHG

## 2022-03-31 DIAGNOSIS — E04.9 NODULAR GOITER: Primary | ICD-10-CM

## 2022-03-31 DIAGNOSIS — E06.9 THYROIDITIS: ICD-10-CM

## 2022-03-31 PROCEDURE — 99214 OFFICE O/P EST MOD 30 MIN: CPT | Performed by: INTERNAL MEDICINE

## 2022-03-31 NOTE — PROGRESS NOTES
Jatin Slater MD              Patient Information  Date:3/31/2022  Name : Clayton Crowe 33 y.o.     YOB: 1990         Referred by: Reji Mc MD         Chief Complaint   Patient presents with    Thyroid Problem       History of present illness    Clayton Crowe is a 33 y.o. female  here for follow-up of thyroid. Nodular goiter, reports intermittent pain and discomfort in the neck, seen ENT, no GERD  Questionable postnasal drainage  No recent thyroid function test.      Prior history  She had similar symptoms 6 to 7 months ago, improved with prednisone, Nexium to some extent  Symptoms did not completely fully resolved. Last 2 months noted worsening  No GERD, no postnasal drainage  No recent infection  No fever    Has strong family history of autoimmune disease  Mother has? Lupus, daughter has PANDAS      No FH of thyroid cancer     Wt Readings from Last 3 Encounters:   03/31/22 149 lb (67.6 kg)   01/28/21 145 lb (65.8 kg)   09/03/20 147 lb (66.7 kg)       History reviewed. No pertinent past medical history. Current Outpatient Medications   Medication Sig    naproxen (NAPROSYN) 500 mg tablet Take 1 Tab by mouth two (2) times daily (with meals). (Patient not taking: Reported on 3/31/2022)    cyclobenzaprine (FLEXERIL) 10 mg tablet Take 1 Tab by mouth three (3) times daily as needed for Muscle Spasm(s). (Patient not taking: Reported on 3/31/2022)    norethindrone-e.estradiol-iron (MINASTRIN 24 FE) 1 mg-20 mcg(24) /75 mg (4) chew Minastrin 24 Fe 1 mg-20 mcg (24)/75 mg (4) chewable tablet (Patient not taking: Reported on 3/31/2022)     No current facility-administered medications for this visit. Review of Systems:  -   Per HPI  Physical Examination:  Blood pressure 100/66, pulse 70, temperature 97.5 °F (36.4 °C), temperature source Temporal, resp. rate 16, height 5' 1\" (1.549 m), weight 149 lb (67.6 kg), SpO2 100 %.     Body mass index is 28.15 kg/m². - General: pleasant, no distress, good eye contact  - HEENT: no exopthalmos, no periorbital edema, no scleral/conjunctival injection, EOMI, no lid lag or stare  - Neck: No palpable nodules, no tenderness overlying thyroid area  - Cardiovascular: regular,  normal S1 and S2,  - Respiratory: clear to auscultation bilaterally  - Musculoskeletal: no proximal muscle weakness in upper or lower extremities  - Integumentary: no  tremors, no edema  - Neurological: alert and oriented   - Psychiatric: normal mood and affect  - Skin - normal turgor    Data Reviewed:         [] Reviewed lab  s    Assessment/Plan:     1. Nodular goiter    2. Thyroiditis        Nodular goiter:   Ultrasound showed 1.4 cm left thyroid nodule with no high risk characteristics. No microcalcifications in 2019. July 2020 1.5 cm left thyroid nodule, irregular borders, no increased vascularity  The size is small to cause dysphagia    In 2019, TPO normal, ESR normal, thyroid function tests were normal, thyroglobulin normal (thyroglobulin will be elevated in thyroiditis)    Check thyroglobulin, TSH, free T4.,    Neck discomfort      Follow-up and Dispositions    · Return in about 1 year (around 3/31/2023). Thank you for allowing me to participate in the care of this patient. Sanjay Minor MD      Patient Shanice Fiore verbalized understanding  Voice-recognition software was used to generate this report, which may result in some phonetic-based errors in the grammar and contents. Even though attempts were made to correct all the mistakes, some may have been missed and remained in the body of the report.

## 2022-03-31 NOTE — PROGRESS NOTES
Duwaine Apley is a 33 y.o. female here for   Chief Complaint   Patient presents with    Thyroid Problem       1. Have you been to the ER, urgent care clinic since your last visit? Hospitalized since your last visit? -no    2. Have you seen or consulted any other health care providers outside of the [address] [place] [address] since your last visit? Include any pap smears or colon screening. -PCP

## 2022-03-31 NOTE — LETTER
3/31/2022    Patient: Akshat Diez   YOB: 1990   Date of Visit: 3/31/2022     Cecy Eddy MD  [address]  [phone]  Via Fax: [phone]    Dear Cecy Eddy MD,      Thank you for referring Ms. Joy Pineda to [address] [place] [address] for evaluation. My notes for this consultation are attached. If you have questions, please do not hesitate to call me. I look forward to following your patient along with you.       Sincerely,    Steph Pérez MD

## 2022-04-01 LAB
T4 FREE SERPL-MCNC: 1 NG/DL (ref 0.8–1.5)
TSH SERPL DL<=0.05 MIU/L-ACNC: 0.72 UIU/ML (ref 0.36–3.74)

## 2022-04-06 ENCOUNTER — TELEPHONE (OUTPATIENT)
Dept: ENDOCRINOLOGY | Age: 32
End: 2022-04-06

## 2022-04-06 NOTE — TELEPHONE ENCOUNTER
Attempted to call. Unsuccessful. Pt's full name on VM. [facility] msg [facility] sand that her thyroid test was normal. A callback number was left for any questions or concerns.

## 2022-04-08 LAB
ANTI-THYROGLOBULIN ANTIBODIES, 500556: <1 IU/ML
THYROGLOBULIN (ICMA), 500542: 8.7 NG/ML
THYROGLOBULIN (TG-RIA), 500002: NORMAL NG/ML

## 2022-10-29 ENCOUNTER — HOSPITAL ENCOUNTER (EMERGENCY)
Age: 32
Discharge: HOME | End: 2022-10-29
Attending: STUDENT IN AN ORGANIZED HEALTH CARE EDUCATION/TRAINING PROGRAM
Payer: COMMERCIAL

## 2022-10-29 VITALS
RESPIRATION RATE: 17 BRPM | TEMPERATURE: 98.1 F | BODY MASS INDEX: 26.62 KG/M2 | SYSTOLIC BLOOD PRESSURE: 131 MMHG | HEIGHT: 61 IN | HEART RATE: 85 BPM | OXYGEN SATURATION: 99 % | DIASTOLIC BLOOD PRESSURE: 100 MMHG | WEIGHT: 141 LBS

## 2022-10-29 DIAGNOSIS — K21.9 GASTROESOPHAGEAL REFLUX DISEASE, UNSPECIFIED WHETHER ESOPHAGITIS PRESENT: ICD-10-CM

## 2022-10-29 DIAGNOSIS — R07.9 CHEST PAIN, UNSPECIFIED TYPE: Primary | ICD-10-CM

## 2022-10-29 LAB
ALBUMIN SERPL-MCNC: 3.9 G/DL (ref 3.5–5)
ALBUMIN/GLOB SERPL: 1.1 {RATIO} (ref 1.1–2.2)
ALP SERPL-CCNC: 94 U/L (ref 45–117)
ALT SERPL-CCNC: 34 U/L (ref 12–78)
ANION GAP SERPL CALC-SCNC: 6 MMOL/L (ref 5–15)
AST SERPL W P-5'-P-CCNC: 20 U/L (ref 15–37)
ATRIAL RATE: 86 BPM
BASOPHILS # BLD: 0 K/UL (ref 0–0.1)
BASOPHILS NFR BLD: 0 % (ref 0–1)
BILIRUB SERPL-MCNC: 0.9 MG/DL (ref 0.2–1)
BUN SERPL-MCNC: 11 MG/DL (ref 6–20)
BUN/CREAT SERPL: 17 (ref 12–20)
CA-I BLD-MCNC: 9.6 MG/DL (ref 8.5–10.1)
CALCULATED P AXIS, ECG09: 12 DEGREES
CALCULATED R AXIS, ECG10: 57 DEGREES
CALCULATED T AXIS, ECG11: 4 DEGREES
CHLORIDE SERPL-SCNC: 106 MMOL/L (ref 97–108)
CO2 SERPL-SCNC: 26 MMOL/L (ref 21–32)
CREAT SERPL-MCNC: 0.64 MG/DL (ref 0.55–1.02)
DIAGNOSIS, 93000: NORMAL
DIFFERENTIAL METHOD BLD: ABNORMAL
EOSINOPHIL # BLD: 0 K/UL (ref 0–0.4)
EOSINOPHIL NFR BLD: 0 % (ref 0–7)
ERYTHROCYTE [DISTWIDTH] IN BLOOD BY AUTOMATED COUNT: 11.6 % (ref 11.5–14.5)
GLOBULIN SER CALC-MCNC: 3.6 G/DL (ref 2–4)
GLUCOSE SERPL-MCNC: 139 MG/DL (ref 65–100)
HCT VFR BLD AUTO: 41.3 % (ref 35–47)
HGB BLD-MCNC: 14.3 G/DL (ref 11.5–16)
IMM GRANULOCYTES # BLD AUTO: 0 K/UL (ref 0–0.04)
IMM GRANULOCYTES NFR BLD AUTO: 0 % (ref 0–0.5)
LIPASE SERPL-CCNC: 42 U/L (ref 73–393)
LYMPHOCYTES # BLD: 0.5 K/UL (ref 0.8–3.5)
LYMPHOCYTES NFR BLD: 11 % (ref 12–49)
MAGNESIUM SERPL-MCNC: 1.9 MG/DL (ref 1.6–2.4)
MCH RBC QN AUTO: 30.3 PG (ref 26–34)
MCHC RBC AUTO-ENTMCNC: 34.6 G/DL (ref 30–36.5)
MCV RBC AUTO: 87.5 FL (ref 80–99)
MONOCYTES # BLD: 0.2 K/UL (ref 0–1)
MONOCYTES NFR BLD: 5 % (ref 5–13)
NEUTS SEG # BLD: 3.7 K/UL (ref 1.8–8)
NEUTS SEG NFR BLD: 84 % (ref 32–75)
NRBC # BLD: 0 K/UL (ref 0–0.01)
NRBC BLD-RTO: 0 PER 100 WBC
P-R INTERVAL, ECG05: 108 MS
PLATELET # BLD AUTO: 171 K/UL (ref 150–400)
PMV BLD AUTO: 11.6 FL (ref 8.9–12.9)
POTASSIUM SERPL-SCNC: 4.1 MMOL/L (ref 3.5–5.1)
PROT SERPL-MCNC: 7.5 G/DL (ref 6.4–8.2)
Q-T INTERVAL, ECG07: 334 MS
QRS DURATION, ECG06: 74 MS
QTC CALCULATION (BEZET), ECG08: 399 MS
RBC # BLD AUTO: 4.72 M/UL (ref 3.8–5.2)
SODIUM SERPL-SCNC: 138 MMOL/L (ref 136–145)
TROPONIN-HIGH SENSITIVITY: 3 NG/L (ref 0–51)
VENTRICULAR RATE, ECG03: 86 BPM
WBC # BLD AUTO: 4.5 K/UL (ref 3.6–11)

## 2022-10-29 PROCEDURE — 83735 ASSAY OF MAGNESIUM: CPT

## 2022-10-29 PROCEDURE — 83690 ASSAY OF LIPASE: CPT

## 2022-10-29 PROCEDURE — 84484 ASSAY OF TROPONIN QUANT: CPT

## 2022-10-29 PROCEDURE — 96374 THER/PROPH/DIAG INJ IV PUSH: CPT

## 2022-10-29 PROCEDURE — 80053 COMPREHEN METABOLIC PANEL: CPT

## 2022-10-29 PROCEDURE — 93005 ELECTROCARDIOGRAM TRACING: CPT

## 2022-10-29 PROCEDURE — 74011250637 HC RX REV CODE- 250/637: Performed by: STUDENT IN AN ORGANIZED HEALTH CARE EDUCATION/TRAINING PROGRAM

## 2022-10-29 PROCEDURE — 99284 EMERGENCY DEPT VISIT MOD MDM: CPT

## 2022-10-29 PROCEDURE — 74011250636 HC RX REV CODE- 250/636: Performed by: STUDENT IN AN ORGANIZED HEALTH CARE EDUCATION/TRAINING PROGRAM

## 2022-10-29 PROCEDURE — 96375 TX/PRO/DX INJ NEW DRUG ADDON: CPT

## 2022-10-29 PROCEDURE — 36415 COLL VENOUS BLD VENIPUNCTURE: CPT

## 2022-10-29 PROCEDURE — 74011000250 HC RX REV CODE- 250: Performed by: STUDENT IN AN ORGANIZED HEALTH CARE EDUCATION/TRAINING PROGRAM

## 2022-10-29 PROCEDURE — 85025 COMPLETE CBC W/AUTO DIFF WBC: CPT

## 2022-10-29 RX ORDER — BISMUTH SUBSALICYLATE 262 MG/1
2 TABLET, CHEWABLE ORAL
Qty: 20 TABLET | Refills: 0 | Status: SHIPPED | OUTPATIENT
Start: 2022-10-29

## 2022-10-29 RX ORDER — MAG HYDROX/ALUMINUM HYD/SIMETH 200-200-20
30 SUSPENSION, ORAL (FINAL DOSE FORM) ORAL ONCE
Status: COMPLETED | OUTPATIENT
Start: 2022-10-29 | End: 2022-10-29

## 2022-10-29 RX ORDER — ACETAMINOPHEN 325 MG/1
650 TABLET ORAL
Qty: 20 TABLET | Refills: 0 | Status: SHIPPED | OUTPATIENT
Start: 2022-10-29

## 2022-10-29 RX ORDER — FAMOTIDINE 20 MG/1
20 TABLET, FILM COATED ORAL 2 TIMES DAILY
Qty: 20 TABLET | Refills: 0 | Status: SHIPPED | OUTPATIENT
Start: 2022-10-29 | End: 2022-11-08

## 2022-10-29 RX ORDER — ONDANSETRON 2 MG/ML
4 INJECTION INTRAMUSCULAR; INTRAVENOUS
Status: COMPLETED | OUTPATIENT
Start: 2022-10-29 | End: 2022-10-29

## 2022-10-29 RX ORDER — ONDANSETRON 4 MG/1
4 TABLET, FILM COATED ORAL
Qty: 20 TABLET | Refills: 0 | Status: SHIPPED | OUTPATIENT
Start: 2022-10-29

## 2022-10-29 RX ORDER — LIDOCAINE HYDROCHLORIDE 20 MG/ML
15 SOLUTION OROPHARYNGEAL
Status: COMPLETED | OUTPATIENT
Start: 2022-10-29 | End: 2022-10-29

## 2022-10-29 RX ADMIN — LIDOCAINE HYDROCHLORIDE 15 ML: 20 SOLUTION ORAL; TOPICAL at 10:00

## 2022-10-29 RX ADMIN — ONDANSETRON 4 MG: 2 INJECTION INTRAMUSCULAR; INTRAVENOUS at 10:00

## 2022-10-29 RX ADMIN — FAMOTIDINE 20 MG: 10 INJECTION INTRAVENOUS at 10:00

## 2022-10-29 RX ADMIN — ALUMINUM HYDROXIDE, MAGNESIUM HYDROXIDE, AND SIMETHICONE 30 ML: 200; 200; 20 SUSPENSION ORAL at 10:00

## 2022-10-29 NOTE — ED PROVIDER NOTES
[facility]  EMERGENCY DEPARTMENT ENCOUNTER NOTE    Date: 10/29/2022  Patient Name: Heyward Nyhan    History of Presenting Illness     Chief Complaint   Patient presents with    Chest Pain     HPI: Heyward Nyhan, 27 y.o. female with  liver nodules (non-cancerous) and   presents for chest pain. For the past 3 days, she has been having a moderate pressure-like midsternal chest pain associated with nausea and epigastric pain. She has never had these pains before. She has a family history of heart attack in her father in his 35s. She had no shortness of breath except intermittently on ambulation but that is not consistent. No fevers, chills, cough. She reports muscle aches and bilateral trapezius ridge tenderness. No sore throat. No headaches. She had negative swabs as outpatient and got an x-ray which was normal.  She comes in for nonresolution of the pain. She is on omeprazole for severe GERD and she has been compliant with her medicines. She took Tums without relief after talking to her GI doctor. .    Medical History   I reviewed the medical, surgical, family, and social history, as well as allergies:    PCP: Dilma Devi MD    Past Medical History:  No past medical history on file.   Past Surgical History:  Past Surgical History:   Procedure Laterality Date    HX APPENDECTOMY      HX CHOLECYSTECTOMY      HX TONSIL AND ADENOIDECTOMY       Current Outpatient Medications:  Current Outpatient Medications   Medication Instructions    acetaminophen (TYLENOL) 650 mg, Oral, EVERY 6 HOURS AS NEEDED    aluminum-magnesium hydroxide (MAALOX) 200-200 mg/5 mL suspension 15 mL, Oral, EVERY 6 HOURS AS NEEDED    bismuth subsalicylate (BISMUTH) 973 mg, Oral, EVERY 6 HOURS AS NEEDED    famotidine (PEPCID) 20 mg, Oral, 2 TIMES DAILY    ondansetron hcl (ZOFRAN) 4 mg, Oral, EVERY 8 HOURS AS NEEDED      Family History:  Family History   Problem Relation Age of Onset    Hypertension Father     Thyroid Disease Father     Thyroid Disease Other     Hypertension Other      Social History:  Social History     Tobacco Use    Smoking status: Never    Smokeless tobacco: Never   Substance Use Topics    Alcohol use: No    Drug use: No     Allergies: Allergies   Allergen Reactions    Peanut Anaphylaxis    Codeine Rash    Penicillins Rash       Review of Systems     Review of Systems  Negative: Positives and pertinent negatives as per HPI. All other systems were reviewed and are negative. Physical Exam & Vital Signs   Vital Signs - I reviewed the patient's vital signs. Patient Vitals for the past 12 hrs:   Temp Pulse Resp BP SpO2   10/29/22 0922 98.1 °F (36.7 °C) 85 -- (!) 131/100 99 %   10/29/22 0921 -- -- 17 -- --     Physical Exam:    GENERAL: awake, alert, cooperative, not in distress  HEENT:  * Pupils equal, EOMI  * Head atraumatic  CV:  * audible heart sounds  * warm and perfused extremities bilaterally  PULMONARY: Good air movement, no wheezes, no crackles  ABDOMEN/: soft, no distension, no guarding, noted mild epigastric abdominal tenderness  EXTREMITIES/BACK: warm and perfused, no tenderness, no edema  SKIN: no rashes or signs of trauma  NEURO:  * Speech clear  * Moves U&LE to command    Medical Decision Making     Patient is a 27 y.o. female presenting for CP. Vitals reveal no significant abnormalities and physical exam reveals  epigastric tedenrness . EKG showed  TWI III, aVF . Based on the history, physical exam, risk factors, and vital signs, differential includes: GERD, esophagitis, pleurisy, ACS. Bedside ultrasound was done and rule out pericardial effusion. Pericarditis is unlikely given the normal EKG and absence of effusion. See ED Course and Reassessment for evaluation and discussion.       EMR Automatically Imported Results     Labs:  Recent Results (from the past 12 hour(s))   CBC WITH AUTOMATED DIFF    Collection Time: 10/29/22  9:30 AM   Result Value Ref Range    WBC 4.5 3.6 - 11.0 K/uL    RBC 4.72 3.80 - 5.20 M/uL    HGB 14.3 11.5 - 16.0 g/dL    HCT 41.3 35.0 - 47.0 %    MCV 87.5 80.0 - 99.0 FL    MCH 30.3 26.0 - 34.0 PG    MCHC 34.6 30.0 - 36.5 g/dL    RDW 11.6 11.5 - 14.5 %    PLATELET 778 209 - 906 K/uL    MPV 11.6 8.9 - 12.9 FL    NRBC 0.0 0.0  WBC    ABSOLUTE NRBC 0.00 0.00 - 0.01 K/uL    NEUTROPHILS 84 (H) 32 - 75 %    LYMPHOCYTES 11 (L) 12 - 49 %    MONOCYTES 5 5 - 13 %    EOSINOPHILS 0 0 - 7 %    BASOPHILS 0 0 - 1 %    IMMATURE GRANULOCYTES 0 0 - 0.5 %    ABS. NEUTROPHILS 3.7 1.8 - 8.0 K/UL    ABS. LYMPHOCYTES 0.5 (L) 0.8 - 3.5 K/UL    ABS. MONOCYTES 0.2 0.0 - 1.0 K/UL    ABS. EOSINOPHILS 0.0 0.0 - 0.4 K/UL    ABS. BASOPHILS 0.0 0.0 - 0.1 K/UL    ABS. IMM. GRANS. 0.0 0.00 - 0.04 K/UL    DF AUTOMATED     METABOLIC PANEL, COMPREHENSIVE    Collection Time: 10/29/22  9:30 AM   Result Value Ref Range    Sodium 138 136 - 145 mmol/L    Potassium 4.1 3.5 - 5.1 mmol/L    Chloride 106 97 - 108 mmol/L    CO2 26 21 - 32 mmol/L    Anion gap 6 5 - 15 mmol/L    Glucose 139 (H) 65 - 100 mg/dL    BUN 11 6 - 20 mg/dL    Creatinine 0.64 0.55 - 1.02 mg/dL    BUN/Creatinine ratio 17 12 - 20      eGFR >60 >60 ml/min/1.73m2    Calcium 9.6 8.5 - 10.1 mg/dL    Bilirubin, total 0.9 0.2 - 1.0 mg/dL    AST (SGOT) 20 15 - 37 U/L    ALT (SGPT) 34 12 - 78 U/L    Alk.  phosphatase 94 45 - 117 U/L    Protein, total 7.5 6.4 - 8.2 g/dL    Albumin 3.9 3.5 - 5.0 g/dL    Globulin 3.6 2.0 - 4.0 g/dL    A-G Ratio 1.1 1.1 - 2.2     LIPASE    Collection Time: 10/29/22  9:30 AM   Result Value Ref Range    Lipase 42 (L) 73 - 393 U/L   TROPONIN-HIGH SENSITIVITY    Collection Time: 10/29/22  9:30 AM   Result Value Ref Range    Troponin-High Sensitivity 3 0 - 51 ng/L   MAGNESIUM    Collection Time: 10/29/22  9:30 AM   Result Value Ref Range    Magnesium 1.9 1.6 - 2.4 mg/dL     Radiologic Studies:  CT Results  (Last 48 hours)      None          CXR Results  (Last 48 hours)      None          Medications ordered:  Medications   alum-mag hydroxide-simeth (MYLANTA) oral suspension 30 mL (30 mL Oral Given 10/29/22 1000)   lidocaine (XYLOCAINE) 2 % viscous solution 15 mL (15 mL Mouth/Throat Given 10/29/22 1000)   famotidine (PF) (PEPCID) 20 mg in 0.9% sodium chloride 10 mL injection (20 mg IntraVENous Given 10/29/22 1000)   ondansetron (ZOFRAN) injection 4 mg (4 mg IntraVENous Given 10/29/22 1000)       ED Course & Reassessment     ED Course:     ED Course as of 10/29/22 1039   Sat Oct 29, 2022   1018 CBC does not show any evidence of acute process. Leukocytosis not present to suggest infection. Hemoglobin not suggestive of acute anemia. [SS]   1021 No significant electrolyte derangements. Creatinine is not elevated more than baseline range making PATRICIA unlikely. No significant transaminitis noted. Normal bilirubin. Magnesium within normal limits. Lipase is not significantly elevated. Troponin is <6, ACS ruled out per the high-sensitivity troponin algorithm as the duration of symptoms does not warrant repeat troponin level.   [SS]   1021 I have high suspicion for esophagitis given severe GERD sxs and history of GERD. [SS]   1036 Patient reports improvement of her symptoms mildly, still has the aching pain. Picture of GERD. Patient was told to follow-up with PCP and return precautions given. [SS]      ED Course User Index  [SS] Kym Friday, MD       Reassessment:    The patient presented with chest pain of uncertain etiology. Based on the patient's historical features and ED evaluation, in addition to the patient's reassuring physical exam, I do not see compelling evidence at this time for a malignant etiology for the patient's chest pain. The patient does not have clinical evidence for pulmonary embolus, malignant cardiac, pulmonary or aortic pathology or the other more malignant etiologies for chest pain. Based on the abovementioned evaluation, a malignant process is unlikely.     Understanding was insured that at this time there is no evidence for a more malignant underlying process, but that early in the process of an illness, an emergency department workup can be falsely reassuring. Routine discharge counseling was given including the fact that any worsening, changing or persistent symptoms should prompt an immediate call or follow up with their primary physician or the emergency department. The importance of appropriate follow up was also discussed. More extensive discharge instructions were given in the patient's discharge paperwork. After completion of evaluation and discussion of results and diagnoses, all the questions were answered. If required, all follow up appointments and treatments were discussed and explained. Understanding was insured prior to discharge. Final Disposition     Discharge: DISCHARGED FROM EMERGENCY DEPARTMENT    Patient will be discharged from the Emergency Department in stable condition. All of the diagnostic tests were reviewed and any questions were answered. Diagnosis, results, follow up if applicable, and return precautions were discussed. I have also put together printed discharge instructions for them that include: 1) educational information regarding their diagnosis, 2) how to care for their diagnosis at home, as well a 3) list of reasons why they would want to return to the ED prior to their follow-up appointment, should their condition change. Any labs or imaging done in the ED will be either printed with the discharge paperwork or available through [address] DISCHARGE PLAN:  1. Current Discharge Medication List        START taking these medications    Details   famotidine (Pepcid) 20 mg tablet Take 1 Tablet by mouth two (2) times a day for 10 days. Qty: 20 Tablet, Refills: 0      ondansetron hcl (Zofran) 4 mg tablet Take 1 Tablet by mouth every eight (8) hours as needed for Nausea or Vomiting.   Qty: 20 Tablet, Refills: 0      acetaminophen (TYLENOL) 325 mg tablet Take 2 Tablets by mouth every six (6) hours as needed for Pain. Qty: 20 Tablet, Refills: 0      bismuth subsalicylate (Bismuth) 296 mg chew Take 2 Tablets by mouth every six (6) hours as needed for Pain. Qty: 20 Tablet, Refills: 0      aluminum-magnesium hydroxide (MAALOX) 200-200 mg/5 mL suspension Take 15 mL by mouth every six (6) hours as needed for Indigestion. Qty: 200 mL, Refills: 0            2.   Follow-up Information       Follow up With Specialties Details Why Contact Info    Evangelina Vazquez MD Family Medicine Schedule an appointment as soon as possible for a visit in 2 days  [facility] [address]  [address] [facility] [address] [phone]      [address] [place] [address] DEPT Emergency Medicine Go to  If symptoms worsen [address] [facility] [address]  [phone]          3. Return to ED if worse    4. Current Discharge Medication List        START taking these medications    Details   famotidine (Pepcid) 20 mg tablet Take 1 Tablet by mouth two (2) times a day for 10 days. Qty: 20 Tablet, Refills: 0  Start date: 10/29/2022, End date: 11/8/2022      ondansetron hcl (Zofran) 4 mg tablet Take 1 Tablet by mouth every eight (8) hours as needed for Nausea or Vomiting. Qty: 20 Tablet, Refills: 0  Start date: 10/29/2022      acetaminophen (TYLENOL) 325 mg tablet Take 2 Tablets by mouth every six (6) hours as needed for Pain. Qty: 20 Tablet, Refills: 0  Start date: 10/29/2022      bismuth subsalicylate (Bismuth) 690 mg chew Take 2 Tablets by mouth every six (6) hours as needed for Pain. Qty: 20 Tablet, Refills: 0  Start date: 10/29/2022      aluminum-magnesium hydroxide (MAALOX) 200-200 mg/5 mL suspension Take 15 mL by mouth every six (6) hours as needed for Indigestion. Qty: 200 mL, Refills: 0  Start date: 10/29/2022             ED Procedures & EKGs   Performed by: Erika Delgado MD  Procedures     EKG interpretation (Preliminary):  Rhythm: normal sinus rhythm; and regular .  Rate (approx.): 86. Axis: normal;  MD interval: normal;  QRS interval: normal ;  ST/T wave: abnormal: Nonspecific T-Wave inversion present in: III,aVF  Other findings: abnormal EKG: Nonspecific T wave inversion    PROCEDURES:  Procedure Note - Limited Bedside Ultrasound - Cardiac  9:45 AM  Performed by: Alpa Torrez MD  Indication: r/o effusion    - Views: Transthoracic bedside US was performed using the parasternal long, parasternal short, apex, and subxiphoid views. - Cardiac activity appreciated showing normal activity. - Pericardial fluid wasnot appreciated. - Gross valvular assessments: intact  - IVC assessment: normal    The procedure took 3 minutes. Patient tolerated procedure. Clinical Tools & Critical Care     HEART SCORE  History: Slightly or Non-suspicious  ECG: Nonspecific repolarization disturbance  Age: Less than or equal to 45 years  Risk Factors: 1 or 2 risk factors  Troponin: Less than or equal to normal limit   HEART Score Total : 2     Management   Scores 0-3: 0.9-1.7% risk of adverse cardiac event. In the HEART Score study, these patients were discharged (0.99% in the retrospective study, 1.7% in the prospective study)   Scores 4-6: 12-16.6% risk of adverse cardiac event. In the HEART Score study, these patients were admitted to the hospital. (11.6% retrospective, 16.6% prospective)   Scores ? 7: 50-65% risk of adverse cardiac event. In the HEART Score study, these patients were candidates for early invasive measures. (65.2% retrospective, 50.1% prospective)   A MACE (Major Adverse Cardiac Event) was defined as all-cause mortality, myocardial infarction, or coronary revascularization. Original/Primary Reference  Kristofer LORENZO, Abimbola Young. Chest pain in the emergency room: value of the HEART score. Neth Heart J. 2008;16(6):191-6. Validation  Sola GARCIA, Kristofer LORENZO, Abimbola GARCIA, et al. A prospective validation of the HEART score for chest pain patients at the emergency department. Int J Cardiol. 2013;168(3):2153-8. Sola BE, Kristofer AJ, Richard Richter, et al. Chest pain in the emergency room: a multicenter validation of the HEART Score. Crit Pathw Cardiol. 2010;9(3):164-9. Tammy ESCOBAR, Charly RF, Aliza BC, et al. The HEART Pathway Randomized Controlled Trial One Year Outcomes. Acad Emerg Med. 2018;       SEPSIS REASSESSMENT NOTE  Sepsis reassessment note not applicable. CRITICAL CARE DOCUMENTATION  NOT MET: Critical care billing criteria and/or time were NOT met. Diagnosis     Clinical Impression:   1. Chest pain, unspecified type    2. Gastroesophageal reflux disease, unspecified whether esophagitis present        Attestations:  Leonel Layton MD    Documentation Comments   - I am the first and primary provider for this patient and am the primary provider of record. - Initial assessment performed. The patients presenting problems have been discussed, and the staff are in agreement with the care plan formulated and outlined with them. I have encouraged them to ask questions as they arise throughout their visit. - Available medical records, nursing notes, old EKGs, and EMS run sheets (if patient was EMS transported) were reviewed    Please note that this dictation was completed with [employer], the computer voice recognition software. Quite often unanticipated grammatical, syntax, homophones, and other interpretive errors are inadvertently transcribed by the computer software. Please disregard these errors. Please excuse any errors that have escaped final proofreading.

## 2022-10-29 NOTE — ED TRIAGE NOTES
Chest pain since Wednesday, neg ekg at [facility] med.  Tightness, nausea, abd pain, pain in shoulder blades, fatigue, body aches

## 2022-10-29 NOTE — DISCHARGE INSTRUCTIONS
Thank you! Thank you for allowing me to care for you in the emergency department. I sincerely hope that you are satisfied with your visit today. It is my goal to provide you with excellent care. Below you will find a list of your labs and imaging from your visit today if applicable. Should you have any questions regarding these results please do not hesitate to call the emergency department. Please review [employer] for a more detailed result list since the below list may not be comprehensive. Instructions on how to sign up to [employer] should be provided in this packet. Labs -     Recent Results (from the past 12 hour(s))   CBC WITH AUTOMATED DIFF    Collection Time: 10/29/22  9:30 AM   Result Value Ref Range    WBC 4.5 3.6 - 11.0 K/uL    RBC 4.72 3.80 - 5.20 M/uL    HGB 14.3 11.5 - 16.0 g/dL    HCT 41.3 35.0 - 47.0 %    MCV 87.5 80.0 - 99.0 FL    MCH 30.3 26.0 - 34.0 PG    MCHC 34.6 30.0 - 36.5 g/dL    RDW 11.6 11.5 - 14.5 %    PLATELET 796 650 - 394 K/uL    MPV 11.6 8.9 - 12.9 FL    NRBC 0.0 0.0  WBC    ABSOLUTE NRBC 0.00 0.00 - 0.01 K/uL    NEUTROPHILS 84 (H) 32 - 75 %    LYMPHOCYTES 11 (L) 12 - 49 %    MONOCYTES 5 5 - 13 %    EOSINOPHILS 0 0 - 7 %    BASOPHILS 0 0 - 1 %    IMMATURE GRANULOCYTES 0 0 - 0.5 %    ABS. NEUTROPHILS 3.7 1.8 - 8.0 K/UL    ABS. LYMPHOCYTES 0.5 (L) 0.8 - 3.5 K/UL    ABS. MONOCYTES 0.2 0.0 - 1.0 K/UL    ABS. EOSINOPHILS 0.0 0.0 - 0.4 K/UL    ABS. BASOPHILS 0.0 0.0 - 0.1 K/UL    ABS. IMM.  GRANS. 0.0 0.00 - 0.04 K/UL    DF AUTOMATED     METABOLIC PANEL, COMPREHENSIVE    Collection Time: 10/29/22  9:30 AM   Result Value Ref Range    Sodium 138 136 - 145 mmol/L    Potassium 4.1 3.5 - 5.1 mmol/L    Chloride 106 97 - 108 mmol/L    CO2 26 21 - 32 mmol/L    Anion gap 6 5 - 15 mmol/L    Glucose 139 (H) 65 - 100 mg/dL    BUN 11 6 - 20 mg/dL    Creatinine 0.64 0.55 - 1.02 mg/dL    BUN/Creatinine ratio 17 12 - 20      eGFR >60 >60 ml/min/1.73m2    Calcium 9.6 8.5 - 10.1 mg/dL Bilirubin, total 0.9 0.2 - 1.0 mg/dL    AST (SGOT) 20 15 - 37 U/L    ALT (SGPT) 34 12 - 78 U/L    Alk. phosphatase 94 45 - 117 U/L    Protein, total 7.5 6.4 - 8.2 g/dL    Albumin 3.9 3.5 - 5.0 g/dL    Globulin 3.6 2.0 - 4.0 g/dL    A-G Ratio 1.1 1.1 - 2.2     LIPASE    Collection Time: 10/29/22  9:30 AM   Result Value Ref Range    Lipase 42 (L) 73 - 393 U/L   TROPONIN-HIGH SENSITIVITY    Collection Time: 10/29/22  9:30 AM   Result Value Ref Range    Troponin-High Sensitivity 3 0 - 51 ng/L   MAGNESIUM    Collection Time: 10/29/22  9:30 AM   Result Value Ref Range    Magnesium 1.9 1.6 - 2.4 mg/dL       Radiologic Studies -   No orders to display     CT Results  (Last 48 hours)      None          CXR Results  (Last 48 hours)      None               If you feel that you have not received excellent quality care or timely care, please ask to speak to the nurse manager. Please choose us in the future for your continued health care needs. ------------------------------------------------------------------------------------------------------------  The exam and treatment you received in the Emergency Department were for an urgent problem and are not intended as complete care. It is important that you follow-up with a doctor, nurse practitioner, or physician assistant to:  (1) confirm your diagnosis,  (2) re-evaluation of changes in your illness and treatment, and  (3) for ongoing care. If your symptoms become worse or you do not improve as expected and you are unable to reach your usual health care provider, you should return to the Emergency Department. We are available 24 hours a day. Please take your discharge instructions with you when you go to your follow-up appointment. If a prescription has been provided, please have it filled as soon as possible to prevent a delay in treatment. Read the entire medication instruction sheet provided to you by the pharmacy.  If you have any questions or reservations about taking the medication due to side effects or interactions with other medications, please call your primary care physician or contact the ER to speak with the charge nurse. Make an appointment with your family doctor or the physician you were referred to for follow-up of this visit as instructed on your discharge paperwork, as this is a mandatory follow-up. Return to the ER if you are unable to be seen or if you are unable to be seen in a timely manner. If you have any problem arranging the follow-up visit, contact the Emergency Department immediately.

## 2023-05-18 RX ORDER — BISMUTH SUBSALICYLATE 262 MG/1
524 TABLET, CHEWABLE ORAL EVERY 6 HOURS PRN
COMMUNITY
Start: 2022-10-29

## 2023-05-18 RX ORDER — ACETAMINOPHEN 325 MG/1
650 TABLET ORAL EVERY 6 HOURS PRN
COMMUNITY
Start: 2022-10-29

## 2023-05-18 RX ORDER — ONDANSETRON 4 MG/1
4 TABLET, FILM COATED ORAL EVERY 8 HOURS PRN
COMMUNITY
Start: 2022-10-29

## 2023-10-02 ENCOUNTER — OFFICE VISIT (OUTPATIENT)
Age: 33
End: 2023-10-02
Payer: COMMERCIAL

## 2023-10-02 VITALS
OXYGEN SATURATION: 99 % | HEIGHT: 61 IN | BODY MASS INDEX: 27.85 KG/M2 | DIASTOLIC BLOOD PRESSURE: 70 MMHG | RESPIRATION RATE: 18 BRPM | SYSTOLIC BLOOD PRESSURE: 104 MMHG | HEART RATE: 86 BPM | WEIGHT: 147.5 LBS | TEMPERATURE: 97.8 F

## 2023-10-02 DIAGNOSIS — R63.5 WEIGHT GAIN: ICD-10-CM

## 2023-10-02 DIAGNOSIS — E04.9 NODULAR GOITER: ICD-10-CM

## 2023-10-02 DIAGNOSIS — L68.0 HIRSUTISM: Primary | ICD-10-CM

## 2023-10-02 PROCEDURE — 99214 OFFICE O/P EST MOD 30 MIN: CPT | Performed by: INTERNAL MEDICINE

## 2023-10-02 RX ORDER — DEXAMETHASONE 1 MG
TABLET ORAL
Qty: 1 TABLET | Refills: 0 | Status: SHIPPED | OUTPATIENT
Start: 2023-10-02

## 2023-10-02 NOTE — PROGRESS NOTES
Stephanie Irby is a 26 y.o. female here for   Chief Complaint   Patient presents with    Thyroid Problem       1. Have you been to the ER, urgent care clinic since your last visit? Hospitalized since your last visit? - ER; 09/26; [employer], needle went through finger on the job    2. Have you seen or consulted any other health care providers outside of the [address] [place] [address] Avenue since your last visit? Include any pap smears or colon screening.- Gastro; December 2022      Per patient, PCP advised patient to be tested for Cortisol.

## 2023-10-02 NOTE — PROGRESS NOTES
Williams Ricks MD                     Patient Information   Date:3/31/2022   Name : Michael Blandon 33 y.o.       YOB: 1990           Referred by: Velma Dukes MD                    History of present illness      Michael Blandon iis here for follow-up  Initially seen for nodular goiter, family history of autoimmune disease  Complains of weight gain, excess hair growth, fatigue, night sweats, family history of premature menopause at age 36  No steroids  She had work-up by PCP which was unremarkable  No recent infection      Has strong family history of autoimmune disease   Mother has? Lupus, daughter has PANDAS         No FH of thyroid cancer      Physical Examination:      General: pleasant, no distress, good eye contact    HEENT: no exopthalmos, no periorbital edema, no scleral/conjunctival injection, EOMI, no lid lag or stare    Neck: No palpable nodules, no tenderness overlying thyroid area    Cardiovascular: regular,  normal S1 and S2,    Respiratory: clear to auscultation bilaterally    Musculoskeletal: no proximal muscle weakness in upper or lower extremities    Integumentary: no  tremors,  no edema    Neurological: alert and oriented     Psychiatric: normal mood and affect    Skin - normal turgor      Data Reviewed:      Reviewed labs from PCP  2023: Testosterone, TSH, free T4, CBC, BMP normal, FSH, estradiol normal, no rheumatoid arthritis      Assessment/Plan:        ICD-10-CM    1. Hirsutism  L68.0 DHEA-Sulfate     dexamethasone (DECADRON) 1 MG tablet     Dexamethasone     Cortisol AM, Total     DHEA-Sulfate      2. Weight gain  R63.5 dexamethasone (DECADRON) 1 MG tablet     Dexamethasone     Cortisol AM, Total            Nodular goiter:    Ultrasound showed 1.4 cm left thyroid nodule with no high risk characteristics. No microcalcifications in 2019.    July 2020 1.5 cm left thyroid nodule, irregular borders, no

## 2023-10-11 LAB — CORTIS AM PEAK SERPL-MCNC: 12.4 UG/DL (ref 6.2–19.4)

## 2023-10-17 ENCOUNTER — PROCEDURE VISIT (OUTPATIENT)
Age: 33
End: 2023-10-17
Payer: COMMERCIAL

## 2023-10-17 VITALS
RESPIRATION RATE: 16 BRPM | HEART RATE: 83 BPM | DIASTOLIC BLOOD PRESSURE: 70 MMHG | HEIGHT: 61 IN | SYSTOLIC BLOOD PRESSURE: 104 MMHG | BODY MASS INDEX: 27.94 KG/M2 | WEIGHT: 148 LBS | OXYGEN SATURATION: 99 % | TEMPERATURE: 99 F

## 2023-10-17 DIAGNOSIS — E04.9 NODULAR GOITER: ICD-10-CM

## 2023-10-17 DIAGNOSIS — R63.5 WEIGHT GAIN: Primary | ICD-10-CM

## 2023-10-17 PROBLEM — M47.816 SPONDYLOSIS OF LUMBAR SPINE: Status: ACTIVE | Noted: 2018-02-23

## 2023-10-17 LAB — DEXAMETHASONE SERPL-MCNC: <30 NG/DL

## 2023-10-17 PROCEDURE — 76536 US EXAM OF HEAD AND NECK: CPT | Performed by: INTERNAL MEDICINE

## 2023-10-17 RX ORDER — PHENTERMINE HYDROCHLORIDE 37.5 MG/1
37.5 TABLET ORAL
Qty: 30 TABLET | Refills: 2 | Status: SHIPPED | OUTPATIENT
Start: 2023-10-17 | End: 2023-11-16

## 2023-10-17 RX ORDER — DEXAMETHASONE 1 MG
TABLET ORAL
Qty: 1 TABLET | Refills: 0 | Status: SHIPPED | OUTPATIENT
Start: 2023-10-17

## 2023-10-17 NOTE — PATIENT INSTRUCTIONS
Phenterine/ Qsymia     Please watch out for chest pain, heart racing, dizziness, or anxiety and let me know if you develop any of these symptoms. Please monitor your blood pressure 1-2 times a week while on this and let me know if you are having any readings over 140/90. You can not be pregnant or get pregnant while you take the medication. If there is any possibility of pregnancy, a pregnancy test should be done to rule out before starting medication.

## 2023-10-17 NOTE — PROGRESS NOTES
Marleny Lees MD                     Patient Information   Date:3/31/2022   Name : Emilee Medina 33 y.o.       YOB: 1990           Referred by: Nazanin Boggs MD                    History of present illness      Emilee Medina iis here for ultrasound. She did the dexamethasone suppression test, threw up dexamethasone after 2 hours, the level of dexamethasone was too low. Need to repeat the labs  Weight gain despite lifestyle changes, interested in medications. Prior history  Initially seen for nodular goiter, family history of autoimmune disease  Complains of weight gain, excess hair growth, fatigue, night sweats, family history of premature menopause at age 36  No steroids  She had work-up by PCP which was unremarkable  No recent infection      Has strong family history of autoimmune disease   Mother has? Lupus, daughter has PANDAS         No FH of thyroid cancer      Physical Examination:       Data Reviewed:      Reviewed labs from PCP  2023: Testosterone, TSH, free T4, CBC, BMP normal, FSH, estradiol normal, no rheumatoid arthritis  October 2023  CRP, vitamin B12, ESR normal, EBV IgG positive   Assessment/Plan:      No diagnosis found. Nodular goiter:    Ultrasound showed 1.4 cm left thyroid nodule with no high risk characteristics. No microcalcifications in 2019. July 2020 1.5 cm left thyroid nodule, irregular borders, no increased vascularity      In 2019, TPO normal, ESR normal, thyroid function tests were normal, thyroglobulin normal (thyroglobulin will be elevated in thyroiditis)  Ultrasound 2023: Left thyroid nodule measuring 1 cm which has decreased in size      Weight gain, fatigue, hirsutism, night sweats  Rule out Cushing's, recheck dexamethasone suppression test    Hirsutism no hyperandrogenism  She is strong family history of premature menopause,?   Perimenopausal      History of kidney stones,

## 2023-10-17 NOTE — PROGRESS NOTES
Saray Irwin is a 26 y.o. female here for   Chief Complaint   Patient presents with    Thyroid Problem    Hirsutism    Ultrasound       1. Have you been to the ER, urgent care clinic since your last visit? Hospitalized since your last visit? -no    2. Have you seen or consulted any other health care providers outside of the [address] [place] [address] Avenue since your last visit?   Include any pap smears or colon screening.-no

## 2023-10-17 NOTE — PROGRESS NOTES
Thyroid Ultrasound Report    Patient Information  Date:10/17/2023  Name : Emilee Medina 26 y.o.     YOB: 1990         Referred by: Nazanin Boggs MD , MD        Multiple real time longitudinal and transverse images were obtained using a high  resolution ultrasound with a Linear transducer. Right thyroid lobe measures 3.8 x 1.6 x 1.07 cm. Left lobe measures 3 cm x 1.5 x 0.88 ,isthmus measures 0.1 cm. Left posterior pole there is a hypoechoic nodule measuring 1 x 0.9 x 0.57 with peripheral vascularization, no microcalcifications. This nodule measured 1.5 cm in the past    Impression:  1 cm left thyroid hypoechoic nodule which has decreased in size.        Abad Cutler MD

## 2024-03-21 ENCOUNTER — OFFICE VISIT (OUTPATIENT)
Age: 34
End: 2024-03-21
Payer: COMMERCIAL

## 2024-03-21 VITALS
HEIGHT: 61 IN | WEIGHT: 137.2 LBS | SYSTOLIC BLOOD PRESSURE: 108 MMHG | DIASTOLIC BLOOD PRESSURE: 77 MMHG | TEMPERATURE: 98.9 F | HEART RATE: 80 BPM | OXYGEN SATURATION: 100 % | BODY MASS INDEX: 25.9 KG/M2

## 2024-03-21 DIAGNOSIS — L68.0 HIRSUTISM: Primary | ICD-10-CM

## 2024-03-21 DIAGNOSIS — M35.05 SJOGREN'S SYNDROME WITH INFLAMMATORY ARTHRITIS (HCC): ICD-10-CM

## 2024-03-21 DIAGNOSIS — R63.5 WEIGHT GAIN: ICD-10-CM

## 2024-03-21 DIAGNOSIS — E04.9 NONTOXIC GOITER, UNSPECIFIED: ICD-10-CM

## 2024-03-21 PROCEDURE — G8484 FLU IMMUNIZE NO ADMIN: HCPCS | Performed by: INTERNAL MEDICINE

## 2024-03-21 PROCEDURE — G8427 DOCREV CUR MEDS BY ELIG CLIN: HCPCS | Performed by: INTERNAL MEDICINE

## 2024-03-21 PROCEDURE — G8419 CALC BMI OUT NRM PARAM NOF/U: HCPCS | Performed by: INTERNAL MEDICINE

## 2024-03-21 PROCEDURE — 1036F TOBACCO NON-USER: CPT | Performed by: INTERNAL MEDICINE

## 2024-03-21 PROCEDURE — 99215 OFFICE O/P EST HI 40 MIN: CPT | Performed by: INTERNAL MEDICINE

## 2024-03-21 RX ORDER — PHENTERMINE HYDROCHLORIDE 37.5 MG/1
37.5 TABLET ORAL
Qty: 90 TABLET | Refills: 1 | Status: SHIPPED | OUTPATIENT
Start: 2024-03-21 | End: 2024-09-21

## 2024-03-21 RX ORDER — DEXAMETHASONE 1 MG
TABLET ORAL
Qty: 2 TABLET | Refills: 0 | Status: SHIPPED | OUTPATIENT
Start: 2024-03-21

## 2024-03-21 NOTE — PATIENT INSTRUCTIONS
Phenterine/ Qsymia     Please watch out for chest pain, heart racing, dizziness, or anxiety and let me know if you develop any of these symptoms.  Please monitor your blood pressure 1-2 times a week while on this and let me know if you are having any readings over 140/90.     You can not be pregnant or get pregnant while you take the medication.If there is any possibility of pregnancy, a pregnancy test should be done to rule out before starting medication.       [facility]

## 2024-03-21 NOTE — PROGRESS NOTES
[facility] DIABETES AND ENDOCRINOLOGY                 Oly Skaggs MD                     Patient Information   Date:3/31/2022   Name : Karina Joya 31 y.o.       YOB: 1990           Referred by: Jordan Maguire MD            Chief Complaint   Patient presents with    Thyroid Problem    Hirsutism               History of present illness      Karina Joya is here for follow-up of nodular goiter, weight gain, hirsutism    She did the dexamethasone suppression test, threw up dexamethasone after 2 hours, the level of dexamethasone was too low.  Need to repeat the labs, not completed yet    Weight gain despite lifestyle changes, was on phentermine, for 3 months, lost 15 to 20 pounds, felt better, inflammation and swelling decreased, she was able to tolerate the food better  Since discontinuing phentermine feels bad  Dry mouth, dry eyes, she thinks she may have Sjogren's syndrome,  Inflammation    Flushing intermittently, no triggers, cycles are regular, sometimes associated with sweating, no diarrhea, no rash to suspect carcinoid  No new medications, maintain symptom diary  No worsening of blood pressure      Prior history  Initially seen for nodular goiter, family history of autoimmune disease  Complains of weight gain, excess hair growth, fatigue, night sweats, family history of premature menopause at age 40  No steroids  She had work-up by PCP which was unremarkable  No recent infection      Has strong family history of autoimmune disease   Mother has?  Lupus, daughter has PANDAS         No FH of thyroid cancer      Physical Examination:  General: pleasant, no distress, good eye contact  HEENT: no exophthalmos, no periorbital edema, EOMI  Neck: No visible thyromegaly  CVS: Regular, no tachycardia  RS: Normal respiratory effort  Musculoskeletal: no tremors  Neurological: alert and oriented  Psychiatric: normal mood and affect  Skin: Normal color   Data Reviewed:    GUILLERMINA negative 2020

## 2024-03-25 LAB
CORTIS AM PEAK SERPL-MCNC: 0.5 UG/DL (ref 6.2–19.4)
ENA SS-A AB SER-ACNC: <0.2 AI (ref 0–0.9)
ENA SS-B AB SER-ACNC: <0.2 AI (ref 0–0.9)

## 2024-04-01 LAB — DEXAMETHASONE SERPL-MCNC: 437 NG/DL

## 2024-04-26 DIAGNOSIS — R61 HYPERHIDROSIS: Primary | ICD-10-CM

## 2024-07-17 PROBLEM — R63.5 WEIGHT GAIN: Status: ACTIVE | Noted: 2024-07-17

## 2024-07-17 PROBLEM — L68.0 HIRSUTISM: Status: ACTIVE | Noted: 2024-07-17

## 2024-07-19 ENCOUNTER — APPOINTMENT (OUTPATIENT)
Facility: HOSPITAL | Age: 34
End: 2024-07-19
Payer: COMMERCIAL

## 2024-07-19 ENCOUNTER — HOSPITAL ENCOUNTER (EMERGENCY)
Facility: HOSPITAL | Age: 34
Discharge: HOME | End: 2024-07-19
Attending: EMERGENCY MEDICINE
Payer: COMMERCIAL

## 2024-07-19 VITALS
HEART RATE: 98 BPM | WEIGHT: 126 LBS | BODY MASS INDEX: 23.79 KG/M2 | RESPIRATION RATE: 16 BRPM | SYSTOLIC BLOOD PRESSURE: 132 MMHG | DIASTOLIC BLOOD PRESSURE: 84 MMHG | HEIGHT: 61 IN | TEMPERATURE: 99.1 F | OXYGEN SATURATION: 100 %

## 2024-07-19 DIAGNOSIS — M47.818 SI JOINT ARTHRITIS: ICD-10-CM

## 2024-07-19 DIAGNOSIS — S39.92XA INJURY OF COCCYX, INITIAL ENCOUNTER: Primary | ICD-10-CM

## 2024-07-19 PROCEDURE — 72220 X-RAY EXAM SACRUM TAILBONE: CPT

## 2024-07-19 PROCEDURE — 96372 THER/PROPH/DIAG INJ SC/IM: CPT

## 2024-07-19 PROCEDURE — 6370000000 HC RX 637 (ALT 250 FOR IP): Performed by: NURSE PRACTITIONER

## 2024-07-19 PROCEDURE — 99284 EMERGENCY DEPT VISIT MOD MDM: CPT

## 2024-07-19 PROCEDURE — 6360000002 HC RX W HCPCS: Performed by: NURSE PRACTITIONER

## 2024-07-19 RX ORDER — KETOROLAC TROMETHAMINE 30 MG/ML
30 INJECTION, SOLUTION INTRAMUSCULAR; INTRAVENOUS
Status: COMPLETED | OUTPATIENT
Start: 2024-07-19 | End: 2024-07-19

## 2024-07-19 RX ORDER — LIDOCAINE 50 MG/G
1 PATCH TOPICAL DAILY
Qty: 10 PATCH | Refills: 0 | Status: SHIPPED | OUTPATIENT
Start: 2024-07-19 | End: 2024-07-29

## 2024-07-19 RX ORDER — LIDOCAINE 4 G/G
1 PATCH TOPICAL ONCE
Status: DISCONTINUED | OUTPATIENT
Start: 2024-07-19 | End: 2024-07-19 | Stop reason: HOSPADM

## 2024-07-19 RX ORDER — IBUPROFEN 600 MG/1
600 TABLET ORAL EVERY 6 HOURS PRN
Qty: 30 TABLET | Refills: 0 | Status: SHIPPED | OUTPATIENT
Start: 2024-07-19

## 2024-07-19 RX ORDER — DEXAMETHASONE SODIUM PHOSPHATE 10 MG/ML
10 INJECTION, SOLUTION INTRAMUSCULAR; INTRAVENOUS
Status: COMPLETED | OUTPATIENT
Start: 2024-07-19 | End: 2024-07-19

## 2024-07-19 RX ORDER — METHYLPREDNISOLONE 4 MG/1
TABLET ORAL
Qty: 1 KIT | Refills: 0 | Status: SHIPPED | OUTPATIENT
Start: 2024-07-19 | End: 2024-07-25

## 2024-07-19 RX ADMIN — DEXAMETHASONE SODIUM PHOSPHATE 10 MG: 10 INJECTION, SOLUTION INTRAMUSCULAR; INTRAVENOUS at 20:58

## 2024-07-19 RX ADMIN — KETOROLAC TROMETHAMINE 30 MG: 30 INJECTION, SOLUTION INTRAMUSCULAR at 20:59

## 2024-07-19 ASSESSMENT — PAIN DESCRIPTION - LOCATION
LOCATION: COCCYX;SACRUM
LOCATION: COCCYX

## 2024-07-19 ASSESSMENT — ENCOUNTER SYMPTOMS: BACK PAIN: 1

## 2024-07-19 ASSESSMENT — PAIN SCALES - GENERAL
PAINLEVEL_OUTOF10: 10
PAINLEVEL_OUTOF10: 8

## 2024-07-19 ASSESSMENT — LIFESTYLE VARIABLES
HOW MANY STANDARD DRINKS CONTAINING ALCOHOL DO YOU HAVE ON A TYPICAL DAY: 1 OR 2
HOW OFTEN DO YOU HAVE A DRINK CONTAINING ALCOHOL: 2-4 TIMES A MONTH

## 2024-07-19 ASSESSMENT — PAIN - FUNCTIONAL ASSESSMENT: PAIN_FUNCTIONAL_ASSESSMENT: 0-10

## 2024-07-20 NOTE — ED NOTES
Discharge instructions reviewed, discharge papers given and pt teaching completed. (3) prescription(s) discussed. Pt instructed to follow up with PCP and [facility] regarding today's visit. Pt also instructed to report to ED if symptoms return, worsen, don't subside, and/or with onset of new symptoms.

## 2024-07-20 NOTE — ED PROVIDER NOTES
[facility] EMERGENCY DEPT  EMERGENCY DEPARTMENT ENCOUNTER      Pt Name: Karina Joya  MRN: [id number]  Birthdate 1990  Date of evaluation: 7/19/2024  Provider: LUIS MIGUEL Tran NP    CHIEF COMPLAINT       Chief Complaint   Patient presents with    Tailbone Pain         HISTORY OF PRESENT ILLNESS   (Location/Symptom, Timing/Onset, Context/Setting, Quality, Duration, Modifying Factors, Severity)  Note limiting factors.   HPI    Karina Joya is a 34 y.o. female with Hx of goiter, Sjogren syndrome, GERD, allergic rhinitis who presents ambulatory by herself to [place] ED with cc of \"tailbone pain.\"     Patient reports that she was sweeping at her camper today when she accidentally fell backwards on a case of water.  She felt immediate pain to her coccyx and sacrum, that is only worsened throughout the course of today.  She states the pain now radiates down her right lower extremity.  No open wounds to the area.  Has taken over-the-counter pain medication with no relief.     Denies any head or neck injury from her fall, loss of consciousness.  Does not take long-term anticoagulation.  Last menstrual period was last week, denies chance of pregnancy.  States otherwise in usual state of health.  Denies tobacco, alcohol, substance abuse        PCP: Jordan Maguire MD    There are no other complaints, changes or physical findings at this time.      Review of External Medical Records:     Nursing Notes were reviewed.    REVIEW OF SYSTEMS    (2-9 systems for level 4, 10 or more for level 5)     Review of Systems   Musculoskeletal:  Positive for arthralgias, back pain and myalgias.       Except as noted above the remainder of the review of systems was reviewed and negative.       PAST MEDICAL HISTORY   No past medical history on file.      SURGICAL HISTORY       Past Surgical History:   Procedure Laterality Date    APPENDECTOMY      CHOLECYSTECTOMY      TONSILLECTOMY AND ADENOIDECTOMY           CURRENT MEDICATIONS

## 2024-07-20 NOTE — ED TRIAGE NOTES
Pt ambulatory slowly to triage for tailbone pain that radiates down legs but worse on right starting Monday. States she fell backwards over case of water landing on tailbone.

## 2024-07-20 NOTE — DISCHARGE INSTRUCTIONS
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  You did not break your sacrum or coccyx tonight, but, please be aware that contusions to this area can often take weeks to heal.  Ice the area and try to avoid use.    How can you care for yourself at home?  Take pain medicines exactly as directed.  If the doctor gave you a prescription medicine for pain, take it as prescribed.  If you are not taking a prescription pain medicine, ask your doctor if you can take an over-the-counter medicine to reduce pain and swelling. Read and follow all instructions on the label.  Put ice or a cold pack on your tailbone for 10 to 20 minutes at a time. Try to do this every 1 to 2 hours for the next 3 days (when you are awake) or until the swelling goes down. Put a thin cloth between the ice and your skin.  You can switch between using ice and heat 2 to 3 days after the injury. Take a warm bath for 20 minutes, 3 or 4 times a day. You can use a doughnut-shaped pillow or towel in the tub to pad the hard tub surface.  Do not sit on hard, unpadded surfaces. Sit on a doughnut-shaped pillow to take pressure off the tailbone area.  Avoid constipation, because straining to have a bowel movement will increase your tailbone pain.  Include fruits, vegetables, beans, and whole grains in your diet each day. These foods are high in fibre.  Drink plenty of fluids. If you have kidney, heart, or liver disease and have to limit fluids, talk with your doctor before you increase your fluid intake.  Get some exercise every day. Build up slowly to at least 2½ hours of moderate to vigorous exercise a week.  Take a fibre supplement, such as Benefibre or Metamucil, every day if needed. Read and follow all instructions on the label.  Schedule time each day for a bowel movement. A daily routine may help. Take your time and do not

## 2024-11-14 ENCOUNTER — HOSPITAL ENCOUNTER (EMERGENCY)
Facility: HOSPITAL | Age: 34
Discharge: HOME | End: 2024-11-14
Attending: STUDENT IN AN ORGANIZED HEALTH CARE EDUCATION/TRAINING PROGRAM
Payer: OTHER MISCELLANEOUS

## 2024-11-14 ENCOUNTER — APPOINTMENT (OUTPATIENT)
Facility: HOSPITAL | Age: 34
End: 2024-11-14
Payer: OTHER MISCELLANEOUS

## 2024-11-14 VITALS
OXYGEN SATURATION: 100 % | RESPIRATION RATE: 16 BRPM | TEMPERATURE: 97.9 F | HEART RATE: 100 BPM | DIASTOLIC BLOOD PRESSURE: 88 MMHG | HEIGHT: 62 IN | SYSTOLIC BLOOD PRESSURE: 133 MMHG | WEIGHT: 128 LBS | BODY MASS INDEX: 23.55 KG/M2
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
## 2024-11-14 PROCEDURE — 73030 X-RAY EXAM OF SHOULDER: CPT

## 2024-11-14 PROCEDURE — 96372 THER/PROPH/DIAG INJ SC/IM: CPT

## 2024-11-14 PROCEDURE — 70450 CT HEAD/BRAIN W/O DYE: CPT

## 2024-11-14 PROCEDURE — 99284 EMERGENCY DEPT VISIT MOD MDM: CPT

## 2024-11-14 PROCEDURE — 71046 X-RAY EXAM CHEST 2 VIEWS: CPT

## 2024-11-14 PROCEDURE — 6370000000 HC RX 637 (ALT 250 FOR IP): Performed by: STUDENT IN AN ORGANIZED HEALTH CARE EDUCATION/TRAINING PROGRAM

## 2024-11-14 PROCEDURE — 6360000002 HC RX W HCPCS: Performed by: STUDENT IN AN ORGANIZED HEALTH CARE EDUCATION/TRAINING PROGRAM

## 2024-11-14 PROCEDURE — 72125 CT NECK SPINE W/O DYE: CPT

## 2024-11-14 RX ORDER — CYCLOBENZAPRINE HCL 10 MG
10 TABLET ORAL 3 TIMES DAILY PRN
Qty: 21 TABLET | Refills: 0 | Status: SHIPPED | OUTPATIENT
Start: 2024-11-14 | End: 2024-11-24

## 2024-11-14 RX ORDER — LIDOCAINE 4 G/G
1 PATCH TOPICAL DAILY
Qty: 30 PATCH | Refills: 0 | Status: SHIPPED | OUTPATIENT
Start: 2024-11-14 | End: 2024-12-14

## 2024-11-14 RX ORDER — ACETAMINOPHEN 325 MG/1
650 TABLET ORAL
Status: COMPLETED | OUTPATIENT
Start: 2024-11-14 | End: 2024-11-14

## 2024-11-14 RX ORDER — KETOROLAC TROMETHAMINE 30 MG/ML
15 INJECTION, SOLUTION INTRAMUSCULAR; INTRAVENOUS
Status: COMPLETED | OUTPATIENT
Start: 2024-11-14 | End: 2024-11-14

## 2024-11-14 RX ORDER — CYCLOBENZAPRINE HCL 10 MG
10 TABLET ORAL
Status: COMPLETED | OUTPATIENT
Start: 2024-11-14 | End: 2024-11-14

## 2024-11-14 RX ORDER — LIDOCAINE 4 G/G
1 PATCH TOPICAL DAILY
Status: DISCONTINUED | OUTPATIENT
Start: 2024-11-14 | End: 2024-11-14 | Stop reason: HOSPADM

## 2024-11-14 RX ORDER — ONDANSETRON 4 MG/1
4 TABLET, ORALLY DISINTEGRATING ORAL
Status: COMPLETED | OUTPATIENT
Start: 2024-11-14 | End: 2024-11-14

## 2024-11-14 RX ADMIN — ONDANSETRON 4 MG: 4 TABLET, ORALLY DISINTEGRATING ORAL at 19:54

## 2024-11-14 RX ADMIN — ACETAMINOPHEN 650 MG: 325 TABLET ORAL at 19:52

## 2024-11-14 RX ADMIN — CYCLOBENZAPRINE HYDROCHLORIDE 10 MG: 10 TABLET, FILM COATED ORAL at 19:53

## 2024-11-14 RX ADMIN — KETOROLAC TROMETHAMINE 15 MG: 30 INJECTION, SOLUTION INTRAMUSCULAR at 19:53

## 2024-11-14 ASSESSMENT — PAIN DESCRIPTION - ORIENTATION: ORIENTATION: RIGHT

## 2024-11-14 ASSESSMENT — LIFESTYLE VARIABLES
HOW MANY STANDARD DRINKS CONTAINING ALCOHOL DO YOU HAVE ON A TYPICAL DAY: 1 OR 2
HOW OFTEN DO YOU HAVE A DRINK CONTAINING ALCOHOL: MONTHLY OR LESS

## 2024-11-14 ASSESSMENT — PAIN - FUNCTIONAL ASSESSMENT: PAIN_FUNCTIONAL_ASSESSMENT: 0-10

## 2024-11-14 ASSESSMENT — PAIN SCALES - GENERAL: PAINLEVEL_OUTOF10: 8
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
## 2024-11-15 NOTE — FLOWSHEET NOTE
Discharge instruction reviewed by Nikky Miranda RN with the patient.  The patient verbalized understanding. Patient provided with AVS.  Patient provided with return to work note.     Patient is ambulatory and steady gait upon discharge. Patient is AAOX4, breathing even and unlabored, skin warm and dry, skin intact.    Patient mobility status  with no difficulty. Provider aware     Patient left ED via Discharge Method: ambulatory to Home with Child.    Opportunity for questions and clarification provided.     Patient given 2 paper scripts.

## 2024-11-15 NOTE — ED PROVIDER NOTES
spinal tenderness, no thoracic or midline lumbar tenderness, normal strength in all extremities, normal sensation, face stable   Reports diffuse right sided neck pain, headache, pain in left shoulder.  Reports that she is having tingling and burning of her scalp   CT head and C-spine obtained shows no evidence of acute traumatic injury  Chest x-ray and shoulder x-ray showed no fracture, no acute traumatic injury  Patient has normal strength and sensation in her extremities do not suspect spinal cord injury no indication for MRI at this time.  Patient given lidocaine patch, ketorolac, Flexeril, Tylenol and Zofran, reevaluation reports mild improvement in pain  Reevaluation exam, and secondary exam without additional injuries, patient reports feeling well and comfortable with plan for discharge with close PCP follow-up.  Advised her to call her PCP tomorrow to establish close follow-up in the next 2 to 5 days.  Patient recommended to take Tylenol and Motrin alternating, lidocaine patch and Flexeril as needed.    Amount and/or Complexity of Data Reviewed  Radiology: ordered. Decision-making details documented in ED Course.    Risk  OTC drugs.  Prescription drug management.                REASSESSMENT     ED Course as of 11/14/24 2126   u Nov 14, 2024 2058 CT HEAD WO CONTRAST  No acute intracranial hemorrhage, mass or infarct.  [SL]   2058 CT CERVICAL SPINE WO CONTRAST  No acute fracture or subluxation. [SL]   2058 XR CHEST (2 VW)  No acute cardiopulmonary disease. [SL]   2058 XR SHOULDER LEFT (MIN 2 VIEWS)  No acute fracture or dislocation. [SL]   2112 On reevaluation patient reports that her pain is slightly improved, still reports that she is feeling some tingling on her scalp that feels like burning.  Advised patient on following up with PCP in the next 2 to 3 days for evaluation.  Strict turn precautions advised should patient develop any numbness or weakness in her arms or legs any change in mental status